# Patient Record
Sex: FEMALE | Race: WHITE | Employment: PART TIME | URBAN - METROPOLITAN AREA
[De-identification: names, ages, dates, MRNs, and addresses within clinical notes are randomized per-mention and may not be internally consistent; named-entity substitution may affect disease eponyms.]

---

## 2017-01-19 ENCOUNTER — TRANSFERRED RECORDS (OUTPATIENT)
Dept: HEALTH INFORMATION MANAGEMENT | Facility: CLINIC | Age: 67
End: 2017-01-19

## 2017-01-24 ENCOUNTER — OFFICE VISIT (OUTPATIENT)
Dept: FAMILY MEDICINE | Facility: CLINIC | Age: 67
End: 2017-01-24
Payer: COMMERCIAL

## 2017-01-24 VITALS
SYSTOLIC BLOOD PRESSURE: 147 MMHG | DIASTOLIC BLOOD PRESSURE: 81 MMHG | TEMPERATURE: 97.5 F | HEART RATE: 80 BPM | HEIGHT: 62 IN | BODY MASS INDEX: 35.88 KG/M2 | WEIGHT: 195 LBS | OXYGEN SATURATION: 95 %

## 2017-01-24 DIAGNOSIS — I10 HYPERTENSION GOAL BP (BLOOD PRESSURE) < 140/90: ICD-10-CM

## 2017-01-24 DIAGNOSIS — Z01.818 PREOP GENERAL PHYSICAL EXAM: Primary | ICD-10-CM

## 2017-01-24 DIAGNOSIS — Z13.6 CARDIOVASCULAR SCREENING; LDL GOAL LESS THAN 160: ICD-10-CM

## 2017-01-24 PROCEDURE — 93000 ELECTROCARDIOGRAM COMPLETE: CPT | Performed by: FAMILY MEDICINE

## 2017-01-24 PROCEDURE — 99214 OFFICE O/P EST MOD 30 MIN: CPT | Performed by: FAMILY MEDICINE

## 2017-01-24 RX ORDER — LISINOPRIL 20 MG/1
20 TABLET ORAL DAILY
Qty: 90 TABLET | Refills: 3 | Status: SHIPPED | OUTPATIENT
Start: 2017-01-24 | End: 2017-08-04 | Stop reason: DRUGHIGH

## 2017-01-24 ASSESSMENT — PAIN SCALES - GENERAL: PAINLEVEL: SEVERE PAIN (7)

## 2017-01-24 NOTE — MR AVS SNAPSHOT
After Visit Summary   1/24/2017    Lily Linares    MRN: [id number]           Patient Information     Date Of Birth          1950        Visit Information        Provider Department      1/24/2017 10:40 AM Joao Pulliam MD [facility]        Today's Diagnoses     Preop general physical exam    -  1     CARDIOVASCULAR SCREENING; LDL GOAL LESS THAN 160         Hypertension goal BP (blood pressure) < 140/90           Care Instructions      Before Your Surgery      Call your surgeon if there is any change in your health. This includes signs of a cold or flu (such as a sore throat, runny nose, cough, rash or fever).    Do not smoke, drink alcohol or take over the counter medicine (unless your surgeon or primary care doctor tells you to) for the 24 hours before and after surgery.    If you take prescribed drugs: Follow your doctor s orders about which medicines to take and which to stop until after surgery.    Eating and drinking prior to surgery: follow the instructions from your surgeon    Take a shower or bath the night before surgery. Use the soap your surgeon gave you to gently clean your skin. If you do not have soap from your surgeon, use your regular soap. Do not shave or scrub the surgery site.  Wear clean pajamas and have clean sheets on your bed.         Follow-ups after your visit        Your next 10 appointments already scheduled     Feb 01, 2017   Procedure with Dedrick Ibanez MD   [facility] (--)    [address] [address] [phone]           The medical center is located at [address] (between [address] in [place], [place] miles north of [place]).              Who to contact     If you have questions or need follow up information about today's clinic visit or your schedule please contact [facility] directly at [phone].  Normal or non-critical lab and imaging results will  "be communicated to you by Hotreaderhart, letter or phone within 4 business days after the clinic has received the results. If you do not hear from us within 7 days, please contact the clinic through [email or web address] or phone. If you have a critical or abnormal lab result, we will notify you by phone as soon as possible.  Submit refill requests through [email or web address] or call your pharmacy and they will forward the refill request to us. Please allow 3 business days for your refill to be completed.          Additional Information About Your Visit        [email or web address] Information     [email or web address] gives you secure access to your electronic health record. If you see a primary care provider, you can also send messages to your care team and make appointments. If you have questions, please call your primary care clinic.  If you do not have a primary care provider, please call [phone] and they will assist you.        Care EveryWhere ID     This is your Care EveryWhere ID. This could be used by other organizations to access your [facility] medical records  [id number]        Your Vitals Were     Pulse Temperature Height BMI (Body Mass Index) Pulse Oximetry Breastfeeding?    80 97.5  F (36.4  C) (Tympanic) 5' 1.5\" (1.562 m) 36.25 kg/m2 95% No       Blood Pressure from Last 3 Encounters:   01/24/17 147/81   12/20/16 174/83   08/19/16 172/85    Weight from Last 3 Encounters:   01/24/17 195 lb (88.451 kg)   12/20/16 197 lb (89.359 kg)   08/19/16 201 lb (91.173 kg)              We Performed the Following     EKG 12-lead complete w/read - Clinics          Today's Medication Changes          These changes are accurate as of: 1/24/17 12:07 PM.  If you have any questions, ask your nurse or doctor.               Start taking these medicines.        Dose/Directions    lisinopril 20 MG tablet   Commonly known as:  PRINIVIL/ZESTRIL   Used for:  Hypertension goal BP (blood pressure) < 140/90   Started by:  Joao Pulliam MD        Dose:  20 mg   Take 1 tablet " (20 mg) by mouth daily   Quantity:  90 tablet   Refills:  3            Where to get your medicines      These medications were sent to [facility] - [place], [place] - [address] [address]  [address] [address] [address]     Phone:  [phone]    - lisinopril 20 MG tablet             Primary Care Provider Office Phone # Fax #    Campbell Jacobs [phone]       [facility] [address] [facility] [address]        Thank you!     Thank you for choosing [facility]  for your care. Our goal is always to provide you with excellent care. Hearing back from our patients is one way we can continue to improve our services. Please take a few minutes to complete the written survey that you may receive in the mail after your visit with us. Thank you!             Your Updated Medication List - Protect others around you: Learn how to safely use, store and throw away your medicines at www.disposemymeds.org.          This list is accurate as of: 1/24/17 12:07 PM.  Always use your most recent med list.                   Brand Name Dispense Instructions for use    albuterol 108 (90 BASE) MCG/ACT Inhaler    albuterol    1 Inhaler    Inhale 2 puffs into the lungs every 4 hours as needed for shortness of breath / dyspnea       lisinopril 20 MG tablet    PRINIVIL/ZESTRIL    90 tablet    Take 1 tablet (20 mg) by mouth daily       omeprazole 20 MG CR capsule    priLOSEC    90 capsule    Take 1 capsule (20 mg) by mouth daily       pimecrolimus 1 % cream    ELIDEL    60 g    Apply topically 2 times daily

## 2017-01-24 NOTE — PROGRESS NOTES
[facility]  [address]  [facility] [address]  [phone]  Dept: [phone]    PRE-OP EVALUATION:  Today's date: 2017    Hypertension: The past 2 years her blood pressures in clinic have been averaging 145/95. Today she was started on lisinopril 20 mg daily.    Lily Linares (: 1950) presents for pre-operative evaluation assessment as requested by Dr. armijo.  She requires evaluation and anesthesia risk assessment prior to undergoing surgery/procedure for treatment of Right Kness .  Proposed procedure: Right Total Knee Arthroplasty    Date of Surgery/ Procedure: 17  Time of Surgery/ Procedure: unknown  Hospital/Surgical Facility: [facility]    Primary Physician: Campbell Jacobs  Type of Anesthesia Anticipated: unknown    Patient has a Health Care Directive or Living Will:  YES     1. NO - Do you have a history of heart attack, stroke, stent, bypass or surgery on an artery in the head, neck, heart or legs?  2. NO - Do you ever have any pain or discomfort in your chest?  3. NO - Do you have a history of  Heart Failure?  4. NO - Are you troubled by shortness of breath when: walking on the level, up a slight hill or at night?  5. NO - Do you currently have a cold, bronchitis or other respiratory infection?  6. NO - Do you have a cough, shortness of breath or wheezing?  7. NO - Do you sometimes get pains in the calves of your legs when you walk?  8. NO - Do you or anyone in your family have previous history of blood clots?  9. NO - Do you or does anyone in your family have a serious bleeding problem such as prolonged bleeding following surgeries or cuts?  10. NO - Have you ever had problems with anemia or been told to take iron pills?  11. NO - Have you had any abnormal blood loss such as black, tarry or bloody stools, or abnormal vaginal bleeding?  12. NO - Have you ever had a blood transfusion?  13. NO - Have you or any of your relatives ever had  problems with anesthesia?  14. NO - Do you have sleep apnea, excessive snoring or daytime drowsiness?  15. NO - Do you have any prosthetic heart valves?  16. NO - Do you have prosthetic joints?  17. NO - Is there any chance that you may be pregnant?      HPI:                                                      Brief HPI related to upcoming procedure: Fairly recent severe right knee pain.      See problem list for active medical problems.  Problems all longstanding and stable, except as noted/documented.  See ROS for pertinent symptoms related to these conditions.                                                                                                  .    MEDICAL HISTORY:                                                      Patient Active Problem List    Diagnosis Date Noted     Cellulitis and abscess of trunk 12/30/2014     Scar condition and fibrosis of skin 04/15/2014     Advanced directives, counseling/discussion 04/02/2014     Patient has completed an Advance/Health Care Directive (HCD), to bring in copy to be scanned into Epic.    Anali Yeung  April 2, 2014         CARDIOVASCULAR SCREENING; LDL GOAL LESS THAN 160 10/31/2010     Eczema of eyelid 03/16/2010     Atopic rhinitis 03/16/2010     (Problem list name updated by automated process. Provider to review and confirm.)       Conjunctivitis, allergic 03/16/2010     Obesity 10/16/2007     Problem list name updated by automated process. 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     OTHER ATOPIC DERMATITIS 10/25/2005     Major depression in complete remission (H)      Past Surgical History   Procedure Laterality Date     Surgical history of -   1969     lt ovary     C appendectomy  1969     Surgical history of -   2007     ORIF left elbow     Esophagoscopy, gastroscopy, duodenoscopy (egd), combined  1/16/2014     Procedure: COMBINED ESOPHAGOSCOPY, GASTROSCOPY, DUODENOSCOPY (EGD),  BIOPSY SINGLE OR MULTIPLE;  Gastroscopy;  Surgeon: Zachariah Nash MD;  Location: [facility]     Mammoplasty reduction bilateral  4/8/2014     Procedure: MAMMOPLASTY REDUCTION BILATERAL;  Bilateral Breast Reduction with Free Nipple/Areola Graft;  Surgeon: Flakita Garcia MD;  Location: [facility]     Current Outpatient Prescriptions   Medication Sig Dispense Refill     omeprazole (PRILOSEC) 20 MG capsule Take 1 capsule (20 mg) by mouth daily 90 capsule 3     pimecrolimus (ELIDEL) 1 % cream Apply topically 2 times daily 60 g 0     albuterol (ALBUTEROL) 108 (90 BASE) MCG/ACT inhaler Inhale 2 puffs into the lungs every 4 hours as needed for shortness of breath / dyspnea 1 Inhaler 0     OTC products: None, except as noted above    Allergies   Allergen Reactions     Nka [No Known Allergies]       Latex Allergy: NO    Social History   Substance Use Topics     Smoking status: Never Smoker      Smokeless tobacco: Never Used     Alcohol Use: Yes      Comment: 1 a day if that     History   Drug Use No       REVIEW OF SYSTEMS:                                                    C: NEGATIVE for fever, chills, change in weight  E/M: NEGATIVE for ear, mouth and throat problems  R: NEGATIVE for significant cough or SOB  CV: NEGATIVE for chest pain, palpitations or peripheral edema    EXAM:                                                    There were no vitals taken for this visit.  GENERAL APPEARANCE: healthy, alert and no distress  HENT: ear canals and TM's normal and nose and mouth without ulcers or lesions  RESP: lungs clear to auscultation - no rales, rhonchi or wheezes  CV: regular rate and rhythm, normal S1 S2, no S3 or S4 and no murmur, click or rub   ABDOMEN: soft, nontender, no HSM or masses and bowel sounds normal  NEURO: Normal strength and tone, sensory exam grossly normal, mentation intact and speech normal    DIAGNOSTICS:                                                    EKG: appears normal, NSR, normal axis, normal  intervals, no acute ST/T changes c/w ischemia, no LVH by voltage criteria, unchanged from previous tracings    Recent Labs   Lab Test  04/12/16   0759  01/13/14   0839   HGB   --   14.2   PLT   --   258   NA  140  140   POTASSIUM  4.0  4.5   CR  0.75  0.80        IMPRESSION:                                                    Reason for surgery/procedure: Chronic right knee pain.    The proposed surgical procedure is considered INTERMEDIATE risk.    REVISED CARDIAC RISK INDEX  The patient has the following serious cardiovascular risks for perioperative complications such as (MI, PE, VFib and 3  AV Block):  No serious cardiac risks  INTERPRETATION: 0 risks: Class I (very low risk - 0.4% complication rate)    The patient has the following additional risks for perioperative complications:  No identified additional risks    No diagnosis found.    RECOMMENDATIONS:                                                          --Patient is to take all scheduled medications on the day of surgery EXCEPT for modifications listed below.    APPROVAL GIVEN to proceed with proposed procedure, without further diagnostic evaluation       Signed Electronically by: Joao Pulliam MD    Copy of this evaluation report is provided to requesting physician.    [facility] Preop Guidelines

## 2017-01-24 NOTE — NURSING NOTE
"Chief Complaint   Patient presents with     Pre-Op Exam     Right Total Knee Arthroplasty 2/1/17       Initial /81 mmHg  Pulse 80  Temp(Src) 97.5  F (36.4  C) (Tympanic)  Ht 5' 1.5\" (1.562 m)  Wt 195 lb (88.451 kg)  BMI 36.25 kg/m2  SpO2 95%  Breastfeeding? No Estimated body mass index is 36.25 kg/(m^2) as calculated from the following:    Height as of this encounter: 5' 1.5\" (1.562 m).    Weight as of this encounter: 195 lb (88.451 kg).  BP completed using cuff size: lucien Butler CMA AAMA      "

## 2017-01-30 ENCOUNTER — ANESTHESIA EVENT (OUTPATIENT)
Dept: SURGERY | Facility: CLINIC | Age: 67
DRG: 470 | End: 2017-01-30
Payer: COMMERCIAL

## 2017-01-30 PROBLEM — I10 HYPERTENSION GOAL BP (BLOOD PRESSURE) < 140/90: Status: ACTIVE | Noted: 2017-01-30

## 2017-02-01 ENCOUNTER — HOSPITAL ENCOUNTER (INPATIENT)
Facility: CLINIC | Age: 67
LOS: 3 days | Discharge: HOME | DRG: 470 | End: 2017-02-04
Attending: ORTHOPAEDIC SURGERY | Admitting: ORTHOPAEDIC SURGERY
Payer: COMMERCIAL

## 2017-02-01 ENCOUNTER — ANESTHESIA (OUTPATIENT)
Dept: SURGERY | Facility: CLINIC | Age: 67
DRG: 470 | End: 2017-02-01
Payer: COMMERCIAL

## 2017-02-01 ENCOUNTER — APPOINTMENT (OUTPATIENT)
Dept: GENERAL RADIOLOGY | Facility: CLINIC | Age: 67
DRG: 470 | End: 2017-02-01
Attending: ORTHOPAEDIC SURGERY
Payer: COMMERCIAL

## 2017-02-01 DIAGNOSIS — Z96.651 STATUS POST TOTAL RIGHT KNEE REPLACEMENT: Primary | ICD-10-CM

## 2017-02-01 PROBLEM — I10 HYPERTENSION GOAL BP (BLOOD PRESSURE) < 140/90: Chronic | Status: ACTIVE | Noted: 2017-01-30

## 2017-02-01 PROBLEM — K21.9 GERD (GASTROESOPHAGEAL REFLUX DISEASE): Chronic | Status: ACTIVE | Noted: 2017-02-01

## 2017-02-01 PROBLEM — Z98.890 POST-OPERATIVE STATE: Status: ACTIVE | Noted: 2017-02-01

## 2017-02-01 LAB
BASOPHILS # BLD AUTO: 0 10E9/L (ref 0–0.2)
BASOPHILS NFR BLD AUTO: 0.5 %
CREAT SERPL-MCNC: 0.75 MG/DL (ref 0.52–1.04)
DIFFERENTIAL METHOD BLD: ABNORMAL
EOSINOPHIL # BLD AUTO: 0.1 10E9/L (ref 0–0.7)
EOSINOPHIL NFR BLD AUTO: 1.2 %
ERYTHROCYTE [DISTWIDTH] IN BLOOD BY AUTOMATED COUNT: 14.8 % (ref 10–15)
GFR SERPL CREATININE-BSD FRML MDRD: 77 ML/MIN/1.7M2
HCT VFR BLD AUTO: 43.6 % (ref 35–47)
HGB BLD-MCNC: 14.7 G/DL (ref 11.7–15.7)
IMM GRANULOCYTES # BLD: 0 10E9/L (ref 0–0.4)
IMM GRANULOCYTES NFR BLD: 0.2 %
INR PPP: 0.98 (ref 0.86–1.14)
LYMPHOCYTES # BLD AUTO: 1.6 10E9/L (ref 0.8–5.3)
LYMPHOCYTES NFR BLD AUTO: 18.9 %
MCH RBC QN AUTO: 26 PG (ref 26.5–33)
MCHC RBC AUTO-ENTMCNC: 33.7 G/DL (ref 31.5–36.5)
MCV RBC AUTO: 77 FL (ref 78–100)
MONOCYTES # BLD AUTO: 0.5 10E9/L (ref 0–1.3)
MONOCYTES NFR BLD AUTO: 5.6 %
NEUTROPHILS # BLD AUTO: 6.2 10E9/L (ref 1.6–8.3)
NEUTROPHILS NFR BLD AUTO: 73.6 %
PLATELET # BLD AUTO: 313 10E9/L (ref 150–450)
POTASSIUM SERPL-SCNC: 3.7 MMOL/L (ref 3.4–5.3)
RBC # BLD AUTO: 5.66 10E12/L (ref 3.8–5.2)
WBC # BLD AUTO: 8.5 10E9/L (ref 4–11)

## 2017-02-01 PROCEDURE — 0SRD0J9 REPLACEMENT OF LEFT KNEE JOINT WITH SYNTHETIC SUBSTITUTE, CEMENTED, OPEN APPROACH: ICD-10-PCS | Performed by: ORTHOPAEDIC SURGERY

## 2017-02-01 PROCEDURE — 25800025 ZZH RX 258: Performed by: NURSE ANESTHETIST, CERTIFIED REGISTERED

## 2017-02-01 PROCEDURE — 25000128 H RX IP 250 OP 636: Performed by: NURSE ANESTHETIST, CERTIFIED REGISTERED

## 2017-02-01 PROCEDURE — 36000063 ZZH SURGERY LEVEL 4 EA 15 ADDTL MIN: Performed by: ORTHOPAEDIC SURGERY

## 2017-02-01 PROCEDURE — 73560 X-RAY EXAM OF KNEE 1 OR 2: CPT | Mod: RT

## 2017-02-01 PROCEDURE — 40000306 ZZH STATISTIC PRE PROC ASSESS II: Performed by: ORTHOPAEDIC SURGERY

## 2017-02-01 PROCEDURE — 25000125 ZZHC RX 250: Performed by: NURSE ANESTHETIST, CERTIFIED REGISTERED

## 2017-02-01 PROCEDURE — 37000009 ZZH ANESTHESIA TECHNICAL FEE, EACH ADDTL 15 MIN: Performed by: ORTHOPAEDIC SURGERY

## 2017-02-01 PROCEDURE — 25000132 ZZH RX MED GY IP 250 OP 250 PS 637: Performed by: ORTHOPAEDIC SURGERY

## 2017-02-01 PROCEDURE — 84132 ASSAY OF SERUM POTASSIUM: CPT | Performed by: PHYSICIAN ASSISTANT

## 2017-02-01 PROCEDURE — 36415 COLL VENOUS BLD VENIPUNCTURE: CPT | Performed by: PHYSICIAN ASSISTANT

## 2017-02-01 PROCEDURE — 85610 PROTHROMBIN TIME: CPT | Performed by: PHYSICIAN ASSISTANT

## 2017-02-01 PROCEDURE — 12000007 ZZH R&B INTERMEDIATE

## 2017-02-01 PROCEDURE — C1776 JOINT DEVICE (IMPLANTABLE): HCPCS | Performed by: ORTHOPAEDIC SURGERY

## 2017-02-01 PROCEDURE — 27110028 ZZH OR GENERAL SUPPLY NON-STERILE: Performed by: ORTHOPAEDIC SURGERY

## 2017-02-01 PROCEDURE — 85025 COMPLETE CBC W/AUTO DIFF WBC: CPT | Performed by: PHYSICIAN ASSISTANT

## 2017-02-01 PROCEDURE — 27810169 ZZH OR IMPLANT GENERAL: Performed by: ORTHOPAEDIC SURGERY

## 2017-02-01 PROCEDURE — 36000093 ZZH SURGERY LEVEL 4 1ST 30 MIN: Performed by: ORTHOPAEDIC SURGERY

## 2017-02-01 PROCEDURE — 25000128 H RX IP 250 OP 636: Performed by: ORTHOPAEDIC SURGERY

## 2017-02-01 PROCEDURE — 71000012 ZZH RECOVERY PHASE 1 LEVEL 1 FIRST HR: Performed by: ORTHOPAEDIC SURGERY

## 2017-02-01 PROCEDURE — 82565 ASSAY OF CREATININE: CPT | Performed by: PHYSICIAN ASSISTANT

## 2017-02-01 PROCEDURE — 25000125 ZZHC RX 250: Performed by: PHYSICIAN ASSISTANT

## 2017-02-01 PROCEDURE — 27210794 ZZH OR GENERAL SUPPLY STERILE: Performed by: ORTHOPAEDIC SURGERY

## 2017-02-01 PROCEDURE — 37000008 ZZH ANESTHESIA TECHNICAL FEE, 1ST 30 MIN: Performed by: ORTHOPAEDIC SURGERY

## 2017-02-01 PROCEDURE — 25000128 H RX IP 250 OP 636: Performed by: PHYSICIAN ASSISTANT

## 2017-02-01 DEVICE — BONE CEMENT PALACOS 00-1112-140-01: Type: IMPLANTABLE DEVICE | Site: KNEE | Status: FUNCTIONAL

## 2017-02-01 DEVICE — IMP BASEPLATE TIBIAL HOWM TRI 3 5520-B-300: Type: IMPLANTABLE DEVICE | Site: KNEE | Status: FUNCTIONAL

## 2017-02-01 DEVICE — IMP COMP PATELLA HOWM TRI ASYM 35X10MM 5551-G-350: Type: IMPLANTABLE DEVICE | Site: KNEE | Status: FUNCTIONAL

## 2017-02-01 DEVICE — IMP INSERT TIBIAL HOWM TRI SIZE 3 09MM 5532-G-309: Type: IMPLANTABLE DEVICE | Site: KNEE | Status: FUNCTIONAL

## 2017-02-01 DEVICE — IMP COMP FEM STRK TRIATHLN PS RT 4 5515-F-402: Type: IMPLANTABLE DEVICE | Site: KNEE | Status: FUNCTIONAL

## 2017-02-01 RX ORDER — CEFAZOLIN SODIUM 1 G/3ML
1 INJECTION, POWDER, FOR SOLUTION INTRAMUSCULAR; INTRAVENOUS SEE ADMIN INSTRUCTIONS
Status: DISCONTINUED | OUTPATIENT
Start: 2017-02-01 | End: 2017-02-01 | Stop reason: HOSPADM

## 2017-02-01 RX ORDER — SODIUM CHLORIDE 9 MG/ML
INJECTION, SOLUTION INTRAVENOUS CONTINUOUS
Status: DISCONTINUED | OUTPATIENT
Start: 2017-02-01 | End: 2017-02-02 | Stop reason: CLARIF

## 2017-02-01 RX ORDER — SODIUM CHLORIDE, SODIUM LACTATE, POTASSIUM CHLORIDE, CALCIUM CHLORIDE 600; 310; 30; 20 MG/100ML; MG/100ML; MG/100ML; MG/100ML
INJECTION, SOLUTION INTRAVENOUS CONTINUOUS
Status: DISCONTINUED | OUTPATIENT
Start: 2017-02-01 | End: 2017-02-01 | Stop reason: HOSPADM

## 2017-02-01 RX ORDER — BUPIVACAINE HYDROCHLORIDE 7.5 MG/ML
INJECTION, SOLUTION INTRASPINAL PRN
Status: DISCONTINUED | OUTPATIENT
Start: 2017-02-01 | End: 2017-02-01

## 2017-02-01 RX ORDER — LISINOPRIL 20 MG/1
20 TABLET ORAL DAILY
Status: DISCONTINUED | OUTPATIENT
Start: 2017-02-01 | End: 2017-02-01

## 2017-02-01 RX ORDER — ONDANSETRON 2 MG/ML
4 INJECTION INTRAMUSCULAR; INTRAVENOUS EVERY 30 MIN PRN
Status: DISCONTINUED | OUTPATIENT
Start: 2017-02-01 | End: 2017-02-01 | Stop reason: HOSPADM

## 2017-02-01 RX ORDER — ONDANSETRON 4 MG/1
4 TABLET, ORALLY DISINTEGRATING ORAL EVERY 30 MIN PRN
Status: DISCONTINUED | OUTPATIENT
Start: 2017-02-01 | End: 2017-02-01 | Stop reason: HOSPADM

## 2017-02-01 RX ORDER — OXYCODONE HYDROCHLORIDE 5 MG/1
5-10 TABLET ORAL EVERY 4 HOURS PRN
Status: DISCONTINUED | OUTPATIENT
Start: 2017-02-01 | End: 2017-02-04 | Stop reason: HOSPADM

## 2017-02-01 RX ORDER — KETOROLAC TROMETHAMINE 30 MG/ML
INJECTION, SOLUTION INTRAMUSCULAR; INTRAVENOUS PRN
Status: DISCONTINUED | OUTPATIENT
Start: 2017-02-01 | End: 2017-02-01

## 2017-02-01 RX ORDER — LIDOCAINE 40 MG/G
CREAM TOPICAL
Status: DISCONTINUED | OUTPATIENT
Start: 2017-02-01 | End: 2017-02-01 | Stop reason: HOSPADM

## 2017-02-01 RX ORDER — ALBUTEROL SULFATE 0.83 MG/ML
2.5 SOLUTION RESPIRATORY (INHALATION)
Status: DISCONTINUED | OUTPATIENT
Start: 2017-02-01 | End: 2017-02-01 | Stop reason: HOSPADM

## 2017-02-01 RX ORDER — GABAPENTIN 100 MG/1
100 CAPSULE ORAL 3 TIMES DAILY
Status: DISCONTINUED | OUTPATIENT
Start: 2017-02-01 | End: 2017-02-04 | Stop reason: HOSPADM

## 2017-02-01 RX ORDER — ALBUTEROL SULFATE 0.83 MG/ML
2.5 SOLUTION RESPIRATORY (INHALATION) EVERY 4 HOURS PRN
Status: DISCONTINUED | OUTPATIENT
Start: 2017-02-01 | End: 2017-02-01 | Stop reason: HOSPADM

## 2017-02-01 RX ORDER — HYDROXYZINE HYDROCHLORIDE 10 MG/1
10 TABLET, FILM COATED ORAL EVERY 6 HOURS PRN
Status: DISCONTINUED | OUTPATIENT
Start: 2017-02-01 | End: 2017-02-04 | Stop reason: HOSPADM

## 2017-02-01 RX ORDER — MEPERIDINE HYDROCHLORIDE 25 MG/ML
12.5 INJECTION INTRAMUSCULAR; INTRAVENOUS; SUBCUTANEOUS
Status: DISCONTINUED | OUTPATIENT
Start: 2017-02-01 | End: 2017-02-01 | Stop reason: HOSPADM

## 2017-02-01 RX ORDER — LISINOPRIL 20 MG/1
20 TABLET ORAL DAILY
Status: DISCONTINUED | OUTPATIENT
Start: 2017-02-01 | End: 2017-02-04 | Stop reason: HOSPADM

## 2017-02-01 RX ORDER — ALBUTEROL SULFATE 90 UG/1
2 AEROSOL, METERED RESPIRATORY (INHALATION) EVERY 4 HOURS PRN
Status: DISCONTINUED | OUTPATIENT
Start: 2017-02-01 | End: 2017-02-04 | Stop reason: HOSPADM

## 2017-02-01 RX ORDER — SODIUM CHLORIDE, SODIUM LACTATE, POTASSIUM CHLORIDE, CALCIUM CHLORIDE 600; 310; 30; 20 MG/100ML; MG/100ML; MG/100ML; MG/100ML
1000 INJECTION, SOLUTION INTRAVENOUS CONTINUOUS
Status: DISCONTINUED | OUTPATIENT
Start: 2017-02-01 | End: 2017-02-01 | Stop reason: HOSPADM

## 2017-02-01 RX ORDER — HYDROMORPHONE HYDROCHLORIDE 1 MG/ML
.3-.5 INJECTION, SOLUTION INTRAMUSCULAR; INTRAVENOUS; SUBCUTANEOUS
Status: DISCONTINUED | OUTPATIENT
Start: 2017-02-01 | End: 2017-02-04 | Stop reason: HOSPADM

## 2017-02-01 RX ORDER — ACETAMINOPHEN 325 MG/1
975 TABLET ORAL EVERY 8 HOURS
Status: COMPLETED | OUTPATIENT
Start: 2017-02-01 | End: 2017-02-04

## 2017-02-01 RX ORDER — CEFAZOLIN SODIUM 2 G/100ML
2 INJECTION, SOLUTION INTRAVENOUS
Status: COMPLETED | OUTPATIENT
Start: 2017-02-01 | End: 2017-02-01

## 2017-02-01 RX ORDER — LIDOCAINE HYDROCHLORIDE 10 MG/ML
INJECTION, SOLUTION INFILTRATION; PERINEURAL PRN
Status: DISCONTINUED | OUTPATIENT
Start: 2017-02-01 | End: 2017-02-01

## 2017-02-01 RX ORDER — CEFAZOLIN SODIUM 2 G/100ML
2 INJECTION, SOLUTION INTRAVENOUS EVERY 8 HOURS
Status: COMPLETED | OUTPATIENT
Start: 2017-02-01 | End: 2017-02-02

## 2017-02-01 RX ORDER — KETAMINE HYDROCHLORIDE 50 MG/ML
INJECTION, SOLUTION INTRAMUSCULAR; INTRAVENOUS PRN
Status: DISCONTINUED | OUTPATIENT
Start: 2017-02-01 | End: 2017-02-01

## 2017-02-01 RX ORDER — FENTANYL CITRATE 50 UG/ML
25-50 INJECTION, SOLUTION INTRAMUSCULAR; INTRAVENOUS
Status: DISCONTINUED | OUTPATIENT
Start: 2017-02-01 | End: 2017-02-01 | Stop reason: HOSPADM

## 2017-02-01 RX ORDER — NALOXONE HYDROCHLORIDE 0.4 MG/ML
.1-.4 INJECTION, SOLUTION INTRAMUSCULAR; INTRAVENOUS; SUBCUTANEOUS
Status: DISCONTINUED | OUTPATIENT
Start: 2017-02-01 | End: 2017-02-04 | Stop reason: HOSPADM

## 2017-02-01 RX ORDER — ACETAMINOPHEN 325 MG/1
650 TABLET ORAL EVERY 4 HOURS PRN
Status: DISCONTINUED | OUTPATIENT
Start: 2017-02-04 | End: 2017-02-04 | Stop reason: HOSPADM

## 2017-02-01 RX ORDER — ONDANSETRON 2 MG/ML
INJECTION INTRAMUSCULAR; INTRAVENOUS PRN
Status: DISCONTINUED | OUTPATIENT
Start: 2017-02-01 | End: 2017-02-01

## 2017-02-01 RX ORDER — ONDANSETRON 4 MG/1
4 TABLET, ORALLY DISINTEGRATING ORAL EVERY 6 HOURS PRN
Status: DISCONTINUED | OUTPATIENT
Start: 2017-02-01 | End: 2017-02-04 | Stop reason: HOSPADM

## 2017-02-01 RX ORDER — PROPOFOL 10 MG/ML
INJECTION, EMULSION INTRAVENOUS PRN
Status: DISCONTINUED | OUTPATIENT
Start: 2017-02-01 | End: 2017-02-01

## 2017-02-01 RX ORDER — HYDROMORPHONE HYDROCHLORIDE 1 MG/ML
.3-.5 INJECTION, SOLUTION INTRAMUSCULAR; INTRAVENOUS; SUBCUTANEOUS EVERY 10 MIN PRN
Status: DISCONTINUED | OUTPATIENT
Start: 2017-02-01 | End: 2017-02-01 | Stop reason: HOSPADM

## 2017-02-01 RX ORDER — ONDANSETRON 2 MG/ML
4 INJECTION INTRAMUSCULAR; INTRAVENOUS EVERY 6 HOURS PRN
Status: DISCONTINUED | OUTPATIENT
Start: 2017-02-01 | End: 2017-02-04 | Stop reason: HOSPADM

## 2017-02-01 RX ORDER — KETOROLAC TROMETHAMINE 15 MG/ML
15 INJECTION, SOLUTION INTRAMUSCULAR; INTRAVENOUS EVERY 6 HOURS PRN
Status: DISCONTINUED | OUTPATIENT
Start: 2017-02-01 | End: 2017-02-02

## 2017-02-01 RX ORDER — DEXAMETHASONE SODIUM PHOSPHATE 4 MG/ML
INJECTION, SOLUTION INTRA-ARTICULAR; INTRALESIONAL; INTRAMUSCULAR; INTRAVENOUS; SOFT TISSUE PRN
Status: DISCONTINUED | OUTPATIENT
Start: 2017-02-01 | End: 2017-02-01

## 2017-02-01 RX ORDER — NALOXONE HYDROCHLORIDE 0.4 MG/ML
.1-.4 INJECTION, SOLUTION INTRAMUSCULAR; INTRAVENOUS; SUBCUTANEOUS
Status: DISCONTINUED | OUTPATIENT
Start: 2017-02-01 | End: 2017-02-01 | Stop reason: HOSPADM

## 2017-02-01 RX ORDER — ALUMINA, MAGNESIA, AND SIMETHICONE 2400; 2400; 240 MG/30ML; MG/30ML; MG/30ML
15-30 SUSPENSION ORAL EVERY 4 HOURS PRN
Status: DISCONTINUED | OUTPATIENT
Start: 2017-02-01 | End: 2017-02-04 | Stop reason: HOSPADM

## 2017-02-01 RX ORDER — AMOXICILLIN 250 MG
1-2 CAPSULE ORAL 2 TIMES DAILY
Status: DISCONTINUED | OUTPATIENT
Start: 2017-02-01 | End: 2017-02-04 | Stop reason: HOSPADM

## 2017-02-01 RX ORDER — FENTANYL CITRATE 50 UG/ML
INJECTION, SOLUTION INTRAMUSCULAR; INTRAVENOUS PRN
Status: DISCONTINUED | OUTPATIENT
Start: 2017-02-01 | End: 2017-02-01

## 2017-02-01 RX ORDER — LIDOCAINE 40 MG/G
CREAM TOPICAL
Status: DISCONTINUED | OUTPATIENT
Start: 2017-02-01 | End: 2017-02-04 | Stop reason: HOSPADM

## 2017-02-01 RX ADMIN — ACETAMINOPHEN 975 MG: 325 TABLET, FILM COATED ORAL at 22:04

## 2017-02-01 RX ADMIN — FENTANYL CITRATE 100 MCG: 50 INJECTION, SOLUTION INTRAMUSCULAR; INTRAVENOUS at 15:37

## 2017-02-01 RX ADMIN — ONDANSETRON 4 MG: 2 INJECTION INTRAMUSCULAR; INTRAVENOUS at 15:30

## 2017-02-01 RX ADMIN — KETOROLAC TROMETHAMINE 30 MG: 30 INJECTION, SOLUTION INTRAMUSCULAR; INTRAVENOUS at 17:00

## 2017-02-01 RX ADMIN — LIDOCAINE HYDROCHLORIDE 1 ML: 10 INJECTION, SOLUTION INFILTRATION; PERINEURAL at 15:35

## 2017-02-01 RX ADMIN — FENTANYL CITRATE 100 MCG: 50 INJECTION, SOLUTION INTRAMUSCULAR; INTRAVENOUS at 15:42

## 2017-02-01 RX ADMIN — PROPOFOL 10 MG: 10 INJECTION, EMULSION INTRAVENOUS at 16:32

## 2017-02-01 RX ADMIN — PROPOFOL 40 MG: 10 INJECTION, EMULSION INTRAVENOUS at 16:55

## 2017-02-01 RX ADMIN — LIDOCAINE HYDROCHLORIDE 1 ML: 10 INJECTION, SOLUTION INFILTRATION; PERINEURAL at 14:00

## 2017-02-01 RX ADMIN — PROPOFOL 50 MG: 10 INJECTION, EMULSION INTRAVENOUS at 16:41

## 2017-02-01 RX ADMIN — MIDAZOLAM HYDROCHLORIDE 2 MG: 1 INJECTION, SOLUTION INTRAMUSCULAR; INTRAVENOUS at 15:45

## 2017-02-01 RX ADMIN — MIDAZOLAM HYDROCHLORIDE 3 MG: 1 INJECTION, SOLUTION INTRAMUSCULAR; INTRAVENOUS at 15:28

## 2017-02-01 RX ADMIN — PROPOFOL 40 MG: 10 INJECTION, EMULSION INTRAVENOUS at 16:50

## 2017-02-01 RX ADMIN — GABAPENTIN 100 MG: 100 CAPSULE ORAL at 22:05

## 2017-02-01 RX ADMIN — CEFAZOLIN SODIUM 2 G: 2 INJECTION, SOLUTION INTRAVENOUS at 22:57

## 2017-02-01 RX ADMIN — KETAMINE HYDROCHLORIDE 20 MG: 50 INJECTION, SOLUTION INTRAMUSCULAR; INTRAVENOUS at 16:43

## 2017-02-01 RX ADMIN — BUPIVACAINE HYDROCHLORIDE IN DEXTROSE 2 ML: 7.5 INJECTION, SOLUTION SUBARACHNOID at 15:35

## 2017-02-01 RX ADMIN — SODIUM CHLORIDE: 9 INJECTION, SOLUTION INTRAVENOUS at 22:09

## 2017-02-01 RX ADMIN — SODIUM CHLORIDE, POTASSIUM CHLORIDE, SODIUM LACTATE AND CALCIUM CHLORIDE 1000 ML: 600; 310; 30; 20 INJECTION, SOLUTION INTRAVENOUS at 14:00

## 2017-02-01 RX ADMIN — PROPOFOL 30 MG: 10 INJECTION, EMULSION INTRAVENOUS at 16:46

## 2017-02-01 RX ADMIN — DEXAMETHASONE SODIUM PHOSPHATE 4 MG: 4 INJECTION, SOLUTION INTRAMUSCULAR; INTRAVENOUS at 15:30

## 2017-02-01 RX ADMIN — CEFAZOLIN SODIUM 2 G: 2 INJECTION, SOLUTION INTRAVENOUS at 15:30

## 2017-02-01 RX ADMIN — KETAMINE HYDROCHLORIDE 20 MG: 50 INJECTION, SOLUTION INTRAMUSCULAR; INTRAVENOUS at 15:58

## 2017-02-01 RX ADMIN — KETAMINE HYDROCHLORIDE 20 MG: 50 INJECTION, SOLUTION INTRAMUSCULAR; INTRAVENOUS at 16:07

## 2017-02-01 RX ADMIN — SODIUM CHLORIDE, POTASSIUM CHLORIDE, SODIUM LACTATE AND CALCIUM CHLORIDE: 600; 310; 30; 20 INJECTION, SOLUTION INTRAVENOUS at 16:00

## 2017-02-01 RX ADMIN — PROPOFOL 30 MG: 10 INJECTION, EMULSION INTRAVENOUS at 15:55

## 2017-02-01 RX ADMIN — FENTANYL CITRATE 50 MCG: 50 INJECTION, SOLUTION INTRAMUSCULAR; INTRAVENOUS at 15:45

## 2017-02-01 RX ADMIN — TRANEXAMIC ACID 1 G: 100 INJECTION, SOLUTION INTRAVENOUS at 15:40

## 2017-02-01 RX ADMIN — SODIUM CHLORIDE, POTASSIUM CHLORIDE, SODIUM LACTATE AND CALCIUM CHLORIDE: 600; 310; 30; 20 INJECTION, SOLUTION INTRAVENOUS at 16:45

## 2017-02-01 RX ADMIN — MIDAZOLAM HYDROCHLORIDE 2 MG: 1 INJECTION, SOLUTION INTRAMUSCULAR; INTRAVENOUS at 15:33

## 2017-02-01 RX ADMIN — EPINEPHRINE 0.2 MG: 1 INJECTION, SOLUTION INTRAMUSCULAR; SUBCUTANEOUS at 15:35

## 2017-02-01 NOTE — ANESTHESIA PREPROCEDURE EVALUATION
Anesthesia Evaluation     . Pt has had prior anesthetic. Type: General    No history of anesthetic complications     ROS/MED HX    ENT/Pulmonary:       Neurologic:  - neg neurologic ROS     Cardiovascular:     (+) hypertension----. : . . . :. .       METS/Exercise Tolerance:  >4 METS   Hematologic:  - neg hematologic  ROS       Musculoskeletal:  - neg musculoskeletal ROS       GI/Hepatic:     (+) GERD       Renal/Genitourinary:  - ROS Renal section negative       Endo:     (+) Obesity, .      Psychiatric:  - neg psychiatric ROS       Infectious Disease:  - neg infectious disease ROS       Malignancy:      - no malignancy   Other:    - neg other ROS           Physical Exam  Normal systems: cardiovascular and dental    Airway   Mallampati: I  TM distance: >3 FB  Neck ROM: full    Dental     Cardiovascular   Rhythm and rate: regular and normal      Pulmonary    breath sounds clear to auscultation                    Anesthesia Plan      History & Physical Review  History and physical reviewed and following examination; no interval change.    ASA Status:  3 .    NPO Status:  > 6 hours    Plan for Spinal Maintenance will be Balanced.    PONV prophylaxis:  Ondansetron (or other 5HT-3) and Dexamethasone or Solumedrol       Postoperative Care      Consents  Anesthetic plan, risks, benefits and alternatives discussed with:  Patient..                          .

## 2017-02-01 NOTE — ANESTHESIA CARE TRANSFER NOTE
Patient: Lily Linares    Procedure(s):  Right Total Knee Arthroplasty - Wound Class: I-Clean    Diagnosis: degenerative joint disease  Diagnosis Additional Information: No value filed.    Anesthesia Type:   No value filed.     Note:  Airway :Room Air  Patient transferred to:PACU        Vitals: (Last set prior to Anesthesia Care Transfer)    CRNA VITALS  2/1/2017 1651 - 2/1/2017 1725      2/1/2017             Pulse: 83    SpO2: 95 %                Electronically Signed By: GERALD Lynn CRNA  February 1, 2017  5:25 PM

## 2017-02-01 NOTE — IP AVS SNAPSHOT
MRN:[id number]                      After Visit Summary   2/1/2017    Lily Linares    MRN: [id number]           Thank you!     Thank you for choosing [facility] for your care. Our goal is always to provide you with excellent care. Hearing back from our patients is one way we can continue to improve our services. Please take a few minutes to complete the written survey that you may receive in the mail after you visit with us. Thank you!        Patient Information     Date Of Birth          1950        About your hospital stay     You were admitted on:  February 1, 2017 You last received care in the:  [facility]    You were discharged on:  February 4, 2017        Reason for your hospital stay       Total knee arthroplasty, right            Reason for your hospital stay       Right knee arthoplasty                  Who to Call     For medical emergencies, please call 911.  For non-urgent questions about your medical care, please call your primary care provider or clinic, [phone]  For questions related to your surgery, please call your surgery clinic        Attending Provider     Provider    Dedrick Ibanez MD       Primary Care Provider Office Phone # Fax #    Campbell Jacobs [phone]       [facility] [address] [facility] [address]        After Care Instructions     Activity       Your activity upon discharge: activity as tolerated            Activity       Your activity upon discharge: activity as tolerated            Diet       Follow this diet upon discharge: Orders Placed This Encounter  Regular Diet Adult                  Follow-up Appointments     Follow-up and recommended labs and tests        Follow up with Dr. Ibanez, at (location with clinic name or city) [facility], within 2 weeks  For post-operative appointment.. No follow up labs or test are needed.            Follow-up and recommended labs and tests         Follow up with orthopedics.  No follow up labs or test are needed.                  Additional Services     Physical Therapy Referral       *This therapy referral will be filtered to a centralized scheduling office at [facility] and the patient will receive a call to schedule an appointment at a [place] location most convenient for them. *     [facility] provides Physical Therapy evaluation and treatment and many specialty services across the [place] system.  If requesting a specialty program, please choose from the list below.    If you have not heard from the scheduling office within 2 business days, please call [phone] for all locations, with the exception of [place], please call [phone].  Treatment: Evaluation & Treatment  Special Instructions/Modalities: as indicated  Special Programs: None    Please be aware that coverage of these services is subject to the terms and limitations of your health insurance plan.  Call member services at your health plan with any benefit or coverage questions.      **Note to Provider:  If you are referring outside of [place] for the therapy appointment, please list the name of the location in the  special instructions  above, print the referral and give to the patient to schedule the appointment.                  Further instructions from your care team       1.  Follow up with Parveen Dias PA-C.  in 2 weeks for post op check and x rays as scheduled.  Call [phone] if appointment needed or questions  2.  Use pain medication as directed  3.  Keep incision clean, covered and dry until post op appointment.  You may shower and get incision wet if no drainage is present.  Do not remove light blue mesh (prineo) on incision.   4.  Continue physical therapy as soon as possible.  You will need to call a therapy department of your choice to arrange future appointments.  Your order for physical therapy is included in your discharge  "paperwork.   5.  Take Aspirin 325 mg  daily  for 42 days for anticoagulation-to prevent blood clots          Pending Results     Date and Time Order Name Status Description    2/3/2017 0738 EKG 12-lead, tracing only In process             Statement of Approval     Ordered          02/04/17 1226  I have reviewed and agree with all the recommendations and orders detailed in this document.   EFFECTIVE NOW     Approved and electronically signed by:  Gabriel Fajardo MD             Admission Information        Provider Department Dept Phone    2/1/2017 Dedrick Ibanez MD [facility] [phone]      Your Vitals Were     Blood Pressure Pulse Temperature    156/78 mmHg 77 98.1  F (36.7  C) (Oral)    Respirations Height Weight    16 1.575 m (5' 2\") 92.5 kg (203 lb 14.8 oz)    BMI (Body Mass Index) Pulse Oximetry       37.29 kg/m2 97%       MyChart Information     [employer] gives you secure access to your electronic health record. If you see a primary care provider, you can also send messages to your care team and make appointments. If you have questions, please call your primary care clinic.  If you do not have a primary care provider, please call [phone] and they will assist you.        Care EveryWhere ID     This is your Care EveryWhere ID. This could be used by other organizations to access your [facility] medical records  [id number]           Review of your medicines      START taking        Dose / Directions    acetaminophen 325 MG tablet   Commonly known as:  TYLENOL   Used for:  Status post total right knee replacement   Notes to Patient:  Do not exceed 4,000 mg in 24 hours        Dose:  975 mg   Take 3 tablets (975 mg) by mouth every 8 hours   Quantity:  100 tablet   Refills:  1       aspirin  MG EC tablet   Used for:  Status post total right knee replacement   Notes to Patient:  Start today        Dose:  325 mg   Take 1 tablet (325 mg) by mouth daily   Quantity:  42 tablet   Refills:  " 0         CONTINUE these medicines which have NOT CHANGED        Dose / Directions    albuterol 108 (90 BASE) MCG/ACT Inhaler   Commonly known as:  albuterol        Dose:  2 puff   Inhale 2 puffs into the lungs every 4 hours as needed for shortness of breath / dyspnea   Quantity:  1 Inhaler   Refills:  0       lisinopril 20 MG tablet   Commonly known as:  PRINIVIL/ZESTRIL   Used for:  Hypertension goal BP (blood pressure) < 140/90        Dose:  20 mg   Take 1 tablet (20 mg) by mouth daily   Quantity:  90 tablet   Refills:  3       omeprazole 20 MG CR capsule   Commonly known as:  priLOSEC   Used for:  Gastritis        Dose:  20 mg   Take 1 capsule (20 mg) by mouth daily   Quantity:  90 capsule   Refills:  3       pimecrolimus 1 % cream   Commonly known as:  ELIDEL   Used for:  Dermatitis   Notes to Patient:  Resume if needed        Apply topically 2 times daily   Quantity:  60 g   Refills:  0            Where to get your medicines      These medications were sent to [facility] [address]  [address] [address]     Phone:  [phone]    - acetaminophen 325 MG tablet  - aspirin  MG EC tablet             Protect others around you: Learn how to safely use, store and throw away your medicines at www.disposemymeds.org.             Medication List: This is a list of all your medications and when to take them. Check marks below indicate your daily home schedule. Keep this list as a reference.      Medications           Morning Afternoon Evening Bedtime As Needed    acetaminophen 325 MG tablet   Commonly known as:  TYLENOL   Take 3 tablets (975 mg) by mouth every 8 hours   Last time this was given:  975 mg on 2/4/2017 11:55 AM   Notes to Patient:  Do not exceed 4,000 mg in 24 hours                                albuterol 108 (90 BASE) MCG/ACT Inhaler   Commonly known as:  albuterol   Inhale 2 puffs into the lungs every 4 hours as needed for shortness of breath /  dyspnea                                   aspirin  MG EC tablet   Take 1 tablet (325 mg) by mouth daily   Notes to Patient:  Start today                                   lisinopril 20 MG tablet   Commonly known as:  PRINIVIL/ZESTRIL   Take 1 tablet (20 mg) by mouth daily   Last time this was given:  20 mg on 2/3/2017 10:40 PM   Next Dose Due:  02/04/17                                   omeprazole 20 MG CR capsule   Commonly known as:  priLOSEC   Take 1 capsule (20 mg) by mouth daily   Last time this was given:  20 mg on 2/4/2017  7:47 AM   Next Dose Due:  02/05/17                                   pimecrolimus 1 % cream   Commonly known as:  ELIDEL   Apply topically 2 times daily   Notes to Patient:  Resume if needed                                          More Information        Discharge Instructions for Total Knee Replacement  You have undergone knee replacement surgery. The knee joint forms where the thighbone, shinbone, and kneecap meet. The knee joint is supported by muscles and ligaments, and is lined with a cushioning called cartilage. Over time, cartilage wears away. This can make the knee feel stiff and painful. Your doctor replaced your painful joint with a knee prosthesis (artificial joint) to relieve pain and restore movement. Here are some instructions to follow once at home.  Home care    When you are allowed to shower, carefully wash your incision with soap and water. Rinse the incision well. Then gently pat it dry. Don t rub the incision, or apply creams or lotions. Sit on a shower stool or chair when you shower to keep from falling.    Take pain medication as directed by your doctor.  Sitting and sleeping    Sit in chairs with arms. The arms make it easier for you to stand up or sit down.    Don t sit for more than 30 to 45 minutes at one time.    Nap if you are tired, but don t stay in bed all day.    Sleep with a pillow under your ankle, not your knee. Be sure to change the position of  your leg during the night.  Moving safely    The key to successful recovery is movement with walking and exercising your knee as directed by your doctor. You should be able to put full weight on your leg unless your doctor tells you otherwise.     Walk up and down stairs with support. Try one step at a time--good knee up, bad knee down. Use the railing if possible.    Don t drive until your doctor says it s OK. Most people can start driving about 6 weeks after surgery. Don t drive while you are taking opioid pain medication.  Other precautions    Avoid soaking your knee in water (no hot tubs, bathtubs, swimming pools) until your doctor says it s OK.    Wear the support stockings you were given in the hospital, as instructed by your doctor. You may wear these stockings for four to six weeks after surgery. If needed, you can place a bandage over the incision to prevent irritation from clothing or support stockings.    Arrange your household to keep the items you need handy. Keep everything else out of the way. Remove items that may cause you to fall, such as throw rugs and electrical cords.    Use nonslip bath mats, grab bars, an elevated toilet seat, and a shower chair in your bathroom.    Until your balance, flexibility, and strength improve, use a cane, crutches, a walker, handrails, or someone to help you.    Keep your hands free by using a backpack, gustabo pack, apron, or pockets to carry things.    Prevent infection. Ask your doctor for instructions if you haven t already received them. Any infection will need to be treated immediately with antibiotics. Call your doctor right away if you think you might have an infection.    Tell your dentist that you have an artificial joint and take antibiotics as prescribed before any dental work.    Tell all your health care providers about your artificial joint before any medical procedure.    Maintain a healthy weight. Get help to lose any extra pounds. Added body weight  puts stress on the knee.    Take any medication you may have been given after surgery. This may include blood-thinning medications to prevent blood clots or antibiotics to prevent infection.  Follow-up  You will need to have your staples removed two to three weeks following surgery.     When to seek medical attention  Call 911 right away if you have:    Chest pain.    Shortness of breath.    Any pain or tenderness in your calf.  Otherwise, call your doctor immediately if you have:    Fever of 100.4 degrees Fahrenheit (38 degrees Celsius) or higher, or shaking chills.    Stiffness, or inability to move the knee.    Increased swelling in your leg.    Increased redness, tenderness, or swelling in or around the knee incision.    Drainage from the knee incision.    Increased knee pain.     8558-0173 [employer]. [address] [place] [address], [place] [address]. All rights reserved. This information is not intended as a substitute for professional medical care. Always follow your healthcare professional's instructions.

## 2017-02-01 NOTE — OP NOTE
Total Knee Arthroplasty Operative Note        PLAN:  Weight bearing status: Weight bearing as tolerated   Activity: Activity as tolerated  Patient may move about with assist as indicated or with supervision   Anticoagulation plan:                 Lovenox inpatient and then  mg daily at discharge  for 42 days  Follow up plan                           Follow up in 2 week(s)        Name: Lily Linares    PCP: Campbell Jacobs    Procedure Date: 2/1/2017    Pre-operative diagnosis: degenerative joint disease   Post-operative diagnosis: Same   Procedure: Total knee arthoplasty (Right)   Surgeon: Dedrick Ibanez MD     Assistant(s): Parveen Dias PA-C   Anesthesia: Spinal Anesthesia   Estimated blood loss: Less than 50 ml   Drains: Hemovac   Specimens: None       Findings: See full dictated operative note for details   Complications: None       Comments: See dictated operative report for full details         Procedure and Findings:    After being informed of risks, benefits, alternatives to the procedure, patient desired to proceed, brought to the operating suite where they were placed under spinal anesthetic. Patient received 2 grams of intravenous Ancef.  Parveen Dias PA-C was present for the entire length of the case for the purposes of proper patient positioning, surgical exposure, and patient safety. A time-out verification step was completed.    Attention was directed towards the patella. A midline incision, vastus splitting minimally invasive approach was utilized. The patella was everted. It was measured at 35 mm. It was resected, remeasured and a 35 mm asymmetric patella was positioned. A protector plate was placed on the patella. Attention was directed toward the distal femur. IM guider jennifer was placed. An 10 mm 5 degree distal femoral cut was completed. The IM guide jennifer was then placed in the tibia. External guide jennifer and tower were utilized and 9 mm button stylus was referenced off the lateral tibial plateau  and cuts were completed. The tibia was sized to a 3. Attention was directed towards the distal femur. It was sized to a 4. The 4-in-1 cutting block was placed along the epicondylar axis. It was secured with 2 pins, anterior, posterior and chamfer cuts were completed. Soft tissue balancing was then carried out. Medial release was completed.After assessing range of motion and stability a flexion/extension gap mismatch was identified.  The decision was made to convert to a posterior stabilized device.  Femoral position was verified.  The PS cutting guide was fixated with 3 pins. PCL resected and trial components were tested.  Ultimately a 9 mm insert gave excellent range of motion and stability throughout the range of motion. Patella was noted to track symmetrically throughout the range to motion. The tibial tray rotation was marked, size was verified, it was secured with 2 pins and punched. Trial components were then removed. The cancellous surfaces were pulsatile lavaged. One batch of Palacos cement was mixed under vacuum. The tibia, femur and patella were sequentially cemented in place. The knee was held in extension with axial compression until the cement had hardened. The 9 mm insert was ultimately selected and secured Care was taken to remove any excess cement. Joint capsular injection with anesthetic solution was performed. Excellent range of motion and stability was demonstrated. A Hemovac drain was placed. The joint was copiously irrigated. Joint capsules were closed with interrupted number 1 running Stratafix. Subcutaneous tissues were closed with 2-0 Vicryl and skin was closed with 3-0 running Stratifix and Prineo wound closure. A sterile dressing was applied. Patient tolerated the procedure well without complication and returned to the [facility] in stable condition.      Dedrick Ibanez    Date: 2/1/2017 Time: 5:14 PM  ?    CONFIDENTIALITY NOTICE This message and any included attachments are from [facility] and are intended only for the addressee. The information contained in this message is confidential and may constitute inside or non-public information under international, federal, or state securities laws. Unauthorized forwarding, printing, copying, distribution, or use of such information is strictly prohibited and may be unlawful. If you are not the addressee, please promptly delete this message and notify the sender of the delivery error by e-mail.

## 2017-02-01 NOTE — IP AVS SNAPSHOT
[facility]    [address] [address]    Phone:  [phone]    Fax:  [phone]                                       After Visit Summary   2/1/2017    Lily Linares    MRN: [id number]           After Visit Summary Signature Page     I have received my discharge instructions, and my questions have been answered. I have discussed any challenges I see with this plan with the nurse or doctor.    ..........................................................................................................................................  Patient/Patient Representative Signature      ..........................................................................................................................................  Patient Representative Print Name and Relationship to Patient    ..................................................               ................................................  Date                                            Time    ..........................................................................................................................................  Reviewed by Signature/Title    ...................................................              ..............................................  Date                                                            Time

## 2017-02-01 NOTE — DISCHARGE INSTRUCTIONS
1.  Follow up with Parveen Dias PA-C.  in 2 weeks for post op check and x rays as scheduled.  Call [phone] if appointment needed or questions  2.  Use pain medication as directed  3.  Keep incision clean, covered and dry until post op appointment.  You may shower and get incision wet if no drainage is present.  Do not remove light blue mesh (prineo) on incision.   4.  Continue physical therapy as soon as possible.  You will need to call a therapy department of your choice to arrange future appointments.  Your order for physical therapy is included in your discharge paperwork.   5.  Take Aspirin 325 mg  daily  for 42 days for anticoagulation-to prevent blood clots

## 2017-02-01 NOTE — ANESTHESIA PROCEDURE NOTES
Peripheral nerve/Neuraxial procedure note : intrathecal  Pre-Procedure  Performed by  LARRY JOE   Location:     Pre-Anesthestic Checklist: patient identified, IV checked, site marked, risks and benefits discussed, informed consent, monitors and equipment checked, pre-op evaluation, at physician/surgeon's request and post-op pain management    Timeout  Correct Patient: Yes   Correct Procedure: Yes   Correct Site: Yes   Correct Laterality: Yes   Correct Position: Yes   Site Marked: Yes   .   Procedure Documentation  ASA 3  .    Procedure:    Intrathecal.  Insertion Site:L3-4  (midline approach)      Patient Prep;mask, sterile gloves, povidone-iodine 7.5% surgical scrub, patient draped.  .  Needle: (). # of attempts: 1. # of redirects: 1.  Spinal Needle: Shay tip 25 G. 3.5 in.  Introducer used. Introducer: 20 G. .     Assessment/Narrative  Paresthesias: Yes.  .  .  clear CSF fluid removed .

## 2017-02-02 ENCOUNTER — APPOINTMENT (OUTPATIENT)
Dept: PHYSICAL THERAPY | Facility: CLINIC | Age: 67
DRG: 470 | End: 2017-02-02
Attending: ORTHOPAEDIC SURGERY
Payer: COMMERCIAL

## 2017-02-02 ENCOUNTER — APPOINTMENT (OUTPATIENT)
Dept: OCCUPATIONAL THERAPY | Facility: CLINIC | Age: 67
DRG: 470 | End: 2017-02-02
Attending: ORTHOPAEDIC SURGERY
Payer: COMMERCIAL

## 2017-02-02 LAB
GLUCOSE SERPL-MCNC: 112 MG/DL (ref 70–99)
HGB BLD-MCNC: 11.9 G/DL (ref 11.7–15.7)

## 2017-02-02 PROCEDURE — 25000128 H RX IP 250 OP 636: Performed by: ORTHOPAEDIC SURGERY

## 2017-02-02 PROCEDURE — 25000128 H RX IP 250 OP 636: Performed by: PHYSICIAN ASSISTANT

## 2017-02-02 PROCEDURE — 97535 SELF CARE MNGMENT TRAINING: CPT | Mod: GO

## 2017-02-02 PROCEDURE — 90662 IIV NO PRSV INCREASED AG IM: CPT | Performed by: ORTHOPAEDIC SURGERY

## 2017-02-02 PROCEDURE — 97116 GAIT TRAINING THERAPY: CPT | Mod: GP | Performed by: PHYSICAL THERAPIST

## 2017-02-02 PROCEDURE — 40000133 ZZH STATISTIC OT WARD VISIT

## 2017-02-02 PROCEDURE — 40000193 ZZH STATISTIC PT WARD VISIT: Performed by: PHYSICAL THERAPIST

## 2017-02-02 PROCEDURE — 36415 COLL VENOUS BLD VENIPUNCTURE: CPT | Performed by: ORTHOPAEDIC SURGERY

## 2017-02-02 PROCEDURE — 97110 THERAPEUTIC EXERCISES: CPT | Mod: GP | Performed by: PHYSICAL THERAPIST

## 2017-02-02 PROCEDURE — 97165 OT EVAL LOW COMPLEX 30 MIN: CPT | Mod: GO

## 2017-02-02 PROCEDURE — 12000007 ZZH R&B INTERMEDIATE

## 2017-02-02 PROCEDURE — 82947 ASSAY GLUCOSE BLOOD QUANT: CPT | Performed by: ORTHOPAEDIC SURGERY

## 2017-02-02 PROCEDURE — 97161 PT EVAL LOW COMPLEX 20 MIN: CPT | Mod: GP | Performed by: PHYSICAL THERAPIST

## 2017-02-02 PROCEDURE — 85018 HEMOGLOBIN: CPT | Performed by: ORTHOPAEDIC SURGERY

## 2017-02-02 PROCEDURE — 25000132 ZZH RX MED GY IP 250 OP 250 PS 637: Performed by: ORTHOPAEDIC SURGERY

## 2017-02-02 PROCEDURE — 99232 SBSQ HOSP IP/OBS MODERATE 35: CPT | Performed by: PHYSICIAN ASSISTANT

## 2017-02-02 RX ORDER — TRAMADOL HYDROCHLORIDE 50 MG/1
50-100 TABLET ORAL EVERY 6 HOURS PRN
Status: DISCONTINUED | OUTPATIENT
Start: 2017-02-02 | End: 2017-02-04 | Stop reason: HOSPADM

## 2017-02-02 RX ORDER — KETOROLAC TROMETHAMINE 15 MG/ML
15 INJECTION, SOLUTION INTRAMUSCULAR; INTRAVENOUS EVERY 6 HOURS PRN
Status: DISPENSED | OUTPATIENT
Start: 2017-02-02 | End: 2017-02-04

## 2017-02-02 RX ADMIN — GABAPENTIN 100 MG: 100 CAPSULE ORAL at 20:20

## 2017-02-02 RX ADMIN — OMEPRAZOLE 20 MG: 20 CAPSULE, DELAYED RELEASE ORAL at 06:23

## 2017-02-02 RX ADMIN — SENNOSIDES AND DOCUSATE SODIUM 1 TABLET: 8.6; 5 TABLET ORAL at 20:20

## 2017-02-02 RX ADMIN — ACETAMINOPHEN 975 MG: 325 TABLET, FILM COATED ORAL at 04:42

## 2017-02-02 RX ADMIN — OXYCODONE HYDROCHLORIDE 5 MG: 5 TABLET ORAL at 14:57

## 2017-02-02 RX ADMIN — CEFAZOLIN SODIUM 2 G: 2 INJECTION, SOLUTION INTRAVENOUS at 07:03

## 2017-02-02 RX ADMIN — RANITIDINE HYDROCHLORIDE 150 MG: 150 TABLET, FILM COATED ORAL at 20:20

## 2017-02-02 RX ADMIN — KETOROLAC TROMETHAMINE 15 MG: 15 INJECTION, SOLUTION INTRAMUSCULAR; INTRAVENOUS at 00:07

## 2017-02-02 RX ADMIN — LISINOPRIL 20 MG: 20 TABLET ORAL at 00:07

## 2017-02-02 RX ADMIN — RANITIDINE HYDROCHLORIDE 150 MG: 150 TABLET, FILM COATED ORAL at 08:37

## 2017-02-02 RX ADMIN — GABAPENTIN 100 MG: 100 CAPSULE ORAL at 14:57

## 2017-02-02 RX ADMIN — INFLUENZA A VIRUS A/CALIFORNIA/7/2009 X-179A (H1N1) ANTIGEN (FORMALDEHYDE INACTIVATED), INFLUENZA A VIRUS A/HONG KONG/4801/2014 X-263B (H3N2) ANTIGEN (FORMALDEHYDE INACTIVATED), AND INFLUENZA B VIRUS B/BRISBANE/60/2008 ANTIGEN (FORMALDEHYDE INACTIVATED) 0.5 ML: 60; 60; 60 INJECTION, SUSPENSION INTRAMUSCULAR at 11:59

## 2017-02-02 RX ADMIN — ENOXAPARIN SODIUM 40 MG: 40 INJECTION SUBCUTANEOUS at 14:58

## 2017-02-02 RX ADMIN — KETOROLAC TROMETHAMINE 15 MG: 15 INJECTION, SOLUTION INTRAMUSCULAR; INTRAVENOUS at 20:23

## 2017-02-02 RX ADMIN — OXYCODONE HYDROCHLORIDE 5 MG: 5 TABLET ORAL at 08:47

## 2017-02-02 RX ADMIN — LISINOPRIL 20 MG: 20 TABLET ORAL at 21:34

## 2017-02-02 RX ADMIN — KETOROLAC TROMETHAMINE 15 MG: 15 INJECTION, SOLUTION INTRAMUSCULAR; INTRAVENOUS at 06:23

## 2017-02-02 RX ADMIN — OXYCODONE HYDROCHLORIDE 10 MG: 5 TABLET ORAL at 20:20

## 2017-02-02 RX ADMIN — ACETAMINOPHEN 975 MG: 325 TABLET, FILM COATED ORAL at 20:19

## 2017-02-02 RX ADMIN — ACETAMINOPHEN 975 MG: 325 TABLET, FILM COATED ORAL at 11:52

## 2017-02-02 RX ADMIN — GABAPENTIN 100 MG: 100 CAPSULE ORAL at 08:37

## 2017-02-02 NOTE — PROGRESS NOTES
[facility] Progress Note  Date of Service: 02/02/2017    Assessment and Plan  Lily Linares is a 66 year old female who presented on 2/1/2017 for scheduled Procedure(s):  ARTHROPLASTY KNEE by Dedrick Ibanez MD and is being followed by the hospital medicine service for co-management of acute and/or chronic perioperative medical problems.    S/p Procedure(s):  ARTHROPLASTY KNEE  - POD #1  - pain control, wound cares, physical therapy, occupational therapy and DVT prophylaxis per orthopedic surgery service    Hypertension goal BP (blood pressure) < 140/90  - continue PTA lisinopril    GERD (gastroesophageal reflux disease)  - continue PTA omeprazole      DVT Prophylaxis: as per orthopedic surgery service - Defer to primary service  Code Status: Full Code    Lines: PIV, without edema/erythema   Watkins catheter: pulled this AM    Discussion: Medically, the patient appears stable.    Disposition: Anticipate discharge 1-2 days. The patient plans to go home with outpatient PT    Attestation:  I have reviewed today's vital signs, notes, medications, labs and imaging.    Vesta Vargas PA-C      Interval History  The patient reports her pain is well controlled.  Denies nausea, vomiting, fever, chills, chest pain, palpitations, shortness of breath, cough, abdominal pain, dysuria, haematuria, urinary retention, and lower extremity swelling/pain.  The patient is passing flatus.  She is voiding freely.    Physical Exam  Temp:  [97.5  F (36.4  C)-99  F (37.2  C)] 97.7  F (36.5  C)  Pulse:  [77] 77  Heart Rate:  [72-93] 72  Resp:  [16-20] 18  BP: (124-168)/(54-97) 129/80 mmHg  SpO2:  [94 %-98 %] 96 %    ROS: 10 point ROS neg other than the symptoms noted above in the HPI.    Weights:   Filed Vitals:    02/01/17 1331 02/01/17 1854   Weight: 86.183 kg (190 lb) 92.5 kg (203 lb 14.8 oz)    Body mass index is 37.29 kg/(m^2).    General: alert and oriented x4, in no acute distress  CV:  Regular rate/rhythm, no appreciable murmur, rub, or gallop  Respiratory: CTA bilaterally, equal chest expansion  GI: Soft, nontender, normoactive BS  Skin: Warm and dry  Musculoskeletal: Negative homans bilaterally.  Non-tender to palpation of posterior calves. No edema to lower extremities.  Neuro: equal  strength    Data    Recent Labs  Lab 02/02/17  0621 02/01/17  1350   WBC  --  8.5   HGB 11.9 14.7   MCV  --  77*   PLT  --  313   INR  --  0.98   POTASSIUM  --  3.7   CR  --  0.75   *  --          Recent Labs  Lab 02/02/17  0621   *        Unresulted Labs Ordered in the Past 30 Days of this Admission     No orders found for last 60 day(s).           Imaging  Recent Results (from the past 24 hour(s))   XR Knee Port Right 1/2 Views    Narrative    KNEE PORTABLE RIGHT ONE TO TWO VIEWS   2/1/2017 6:07 PM     HISTORY: Postoperative total knee    COMPARISON: None.    FINDINGS: There has been a recent right total knee arthroplasty.  Components appear well seated. Surgical drain in place.      Impression    IMPRESSION: Recent right total knee arthroplasty in good condition.    JOEL KNOX MD        I reviewed all new labs and imaging results over the last 24 hours. I personally reviewed no images or EKG's today.    Medications    NaCl 125 mL/hr at 02/01/17 2209       omeprazole  20 mg Oral QAM AC     sodium chloride (PF)  3 mL Intracatheter Q8H     enoxaparin  40 mg Subcutaneous Q24H     acetaminophen  975 mg Oral Q8H     senna-docusate  1-2 tablet Oral BID     ranitidine  150 mg Oral BID     gabapentin  100 mg Oral TID     influenza Vac Split High-Dose  0.5 mL Intramuscular Prior to discharge     lisinopril  20 mg Oral Daily       Vesta Vargas PA-C

## 2017-02-02 NOTE — PLAN OF CARE
"Problem: Goal Outcome Summary  Goal: Goal Outcome Summary  Patient able to rest during night, had dangled and stood for evening shift. CPM in place from about 2100 until 0445, patient tolerates well and states \"it feels good.\" Patient pain controlled with Toradol and scheduled Tylenol. Patient afraid to take anything stronger for pain as she has a history of terrible diarrhea with oxycodone; would be willing to discuss other pain medication options with providers in the morning. Watkins draining well, Hemovac emptied at this time. Ice pack over knee. Capnography remains in place. Patient drinking water, has not wanted anything else to drink during the night. VSS, BP improved with dose of lisinopril that she typically takes at home at HS. Patient doing well.         "

## 2017-02-02 NOTE — PROGRESS NOTES
02/02/17 1000   Quick Adds   Type of Visit Initial Occupational Therapy Evaluation   Living Environment   Lives With spouse   Living Arrangements house   Home Accessibility stairs to enter home  (walk-in shower)   Number of Stairs to Enter Home 2  (no railing)   Number of Stairs Within Home (pt plans on living on main level for first week. )   Self-Care   Regular Exercise yes   Activity/Exercise Type swimming   Activity/Exercise/Self-Care Comment Pt has: shower chair, RTS, reacher and leg .    Functional Level Prior   Ambulation 0-->independent   Transferring 0-->independent   Toileting 0-->independent   Bathing 0-->independent   Dressing 0-->independent   Eating 0-->independent   Communication 0-->understands/communicates without difficulty   Swallowing 0-->swallows foods/liquids without difficulty   Cognition 0 - no cognition issues reported   Fall history within last six months no   General Information   Onset of Illness/Injury or Date of Surgery - Date 02/01/17   Referring Physician Dedrick Ibanez MD   Patient/Family Goals Statement To return home, goal is for tomorrow.    Additional Occupational Profile Info/Pertinent History of Current Problem Right Total Knee Arthroplasty   Precautions/Limitations (knee precautions)   Weight-Bearing Status - RLE weight-bearing as tolerated   Cognitive Status Examination   Orientation orientation to person, place and time   Level of Consciousness alert   Able to Follow Commands WNL/WFL   Pain Assessment   Patient Currently in Pain Yes, see Vital Sign flowsheet  (controlled)   Transfer Skill: Bed to Chair/Chair to Bed   Level of Anderson: Bed to Chair stand-by assist   Transfer Skill: Sit to Stand   Level of Anderson: Sit/Stand stand-by assist   Physical Assist/Nonphysical Assist: Sit/Stand supervision   Transfer Skill: Sit to Stand weight-bearing as tolerated   Assistive Device for Transfer: Sit/Stand rolling walker   Transfer Skill: Toilet Transfer   Level  "of Pender: Toilet stand-by assist   Physical Assist/Nonphysical Assist: Toilet supervision   Weight-Bearing Restrictions: Toilet weight-bearing as tolerated   Assistive Device rolling walker   Upper Body Dressing   Level of Pender: Dress Upper Body independent   Lower Body Dressing   Level of Pender: Dress Lower Body stand-by assist   Physical Assist/Nonphysical Assist: Dress Lower Body supervision   Instrumental Activities of Daily Living (IADL)   IADL Comments  can assist with IADLs upon discharge.    General Therapy Interventions   Planned Therapy Interventions ADL retraining   Clinical Impression   Criteria for Skilled Therapeutic Interventions Met yes, treatment indicated   OT Diagnosis decreased independence with ADLs   Influenced by the following impairments decreased ROM in R knee   Assessment of Occupational Performance 1-3 Performance Deficits   Identified Performance Deficits dressing, bathing   Clinical Decision Making (Complexity) Low complexity   Therapy Frequency daily   Predicted Duration of Therapy Intervention (days/wks) 1x treat   Anticipated Discharge Disposition Home with Assist   Risks and Benefits of Treatment have been explained. Yes   Patient, Family & other staff in agreement with plan of care Yes   [facility] AM-PAC  \"6 Clicks\" Daily Activity Inpatient Short Form   1. Putting on and taking off regular lower body clothing? 3 - A Little   2. Bathing (including washing, rinsing, drying)? 3 - A Little   3. Toileting, which includes using toilet, bedpan or urinal? 4 - None   4. Putting on and taking off regular upper body clothing? 4 - None   5. Taking care of personal grooming such as brushing teeth? 4 - None   6. Eating meals? 4 - None   Daily Activity Raw Score (Score out of 24.Lower scores equate to lower levels of function) 22   Total Evaluation Time   Total Evaluation Time (Minutes) 8     Jeny Eaton OTR/L    "

## 2017-02-02 NOTE — CONSULTS
Care Transitions:  Met with the patient for discharge needs.  She will return home and have outpatient PT.  There are no discharge needs identified.  If immediate needs arise, please contact Care Management at [phone].   Maria Fernanda Jorgensen RN, Care Coordinator

## 2017-02-02 NOTE — PLAN OF CARE
Problem: Goal Outcome Summary  Goal: Goal Outcome Summary  PT-  Evaluation completed, RX initiated.  Pt reporting minimal pain.  Pt ambulated  100 feet x1 with RW, SBA. Steady gait     REC- return home w/ assistance from her  as needed; outpatient PT

## 2017-02-02 NOTE — PROGRESS NOTES
"SPIRITUAL HEALTH SERVICES  SPIRITUAL ASSESSMENT Progress Note  [facility] [place] - Med / Surg    PRIMARY FOCUS:     Emotional/spiritual/Protestant distress    Support for coping    ILLNESS CIRCUMSTANCES:   Reviewed documentation. Reflective conversation shared with pt, Sabrina, which integrated elements of illness and family narratives.     Context of Serious Illness/Symptom(s) - TKA    Resources for Support - Sabrina stated that her , who is a  for [employer], had just stopped by because he was picking up a patient.  She also mentioned a daughter and , as well as a grand-daughter who recently moved into homes near theirs.    DISTRESS:     Emotional/Existential/Relational Distress - Sabrina stated that no matter what is going on she has learned to accept what comes and learn through the experience.       Spiritual/Druze Distress - None addressed    Social/Cultural/Economic Distress - Sabrina stated that she was retired but there have been some financial challenges.  She stated that the living situation for her daughter and extended family has been recently resolved and she is very thankful for that.    SPIRITUAL/Mandaeism COPING:     Anglican/Telma - Not addressed    Spiritual Practice(s) - Sabrina welcomed prayer    Emotional/Existential/Relational Connections - Sabrina spoke of working for many years at [employer] and of the long history her family has in the [place] with ancestors settling there in the 1840's.      GOALS OF CARE:    Goals of Care - Sabrina stated she is \"ready to get out\" and get on with life    Meaning/Sense-Making - Sabrina's reflections were on the importance of making life decisions and if they don't work out the best, learn from them Don't dwell on them.  \"Pity parties don't help and make life miserable for you and for those around you.\"     PLAN: No follow up visit planned unless requested by pt or staff.    Daniel Coreas M.A, [facility]  Staff   Pager [phone]    "

## 2017-02-02 NOTE — PROGRESS NOTES
"[place] [facility] ADMISSION NOTE    Patient admitted to room 2400 at approximately 1845 via cart from surgery. Patient was accompanied by transport tech.     Verbal SBAR report received from [facility] prior to patient arrival.     Patient trasferred to bed via air beverley. Patient alert and oriented X 3. The patient is not having any pain.  . Admission vital signs: Blood pressure 138/69, pulse 77, temperature 97.5  F (36.4  C), temperature source Oral, resp. rate 18, height 1.575 m (5' 2\"), weight 92.5 kg (203 lb 14.8 oz), SpO2 95 %, not currently breastfeeding. Patient was oriented to plan of care, call light, bed controls, tv, telephone, bathroom and visiting hours.     The following safety risks were identified during admission: fall. Yellow risk band applied: YES.     Kizzy Black      "

## 2017-02-02 NOTE — ANESTHESIA POSTPROCEDURE EVALUATION
Patient: Lily Linares    Procedure(s):  Right Total Knee Arthroplasty - Wound Class: I-Clean    Diagnosis:degenerative joint disease  Diagnosis Additional Information: No value filed.    Anesthesia Type:  No value filed.    Note:  Anesthesia Post Evaluation    Patient location during evaluation: Bedside  Patient participation: Able to participate in evaluation but full recovery from regional anesthesia has not yet ocurrred but is anticipated to occur within 48 hours  Level of consciousness: awake and alert  Pain management: adequate  Airway patency: patent  Cardiovascular status: acceptable  Respiratory status: acceptable  Hydration status: acceptable  PONV: none     Anesthetic complications: None          Last vitals:  Filed Vitals:    02/01/17 1800 02/01/17 1815 02/01/17 1830   BP: 126/62 135/65 142/65   Pulse:      Temp:  37.1  C (98.7  F)    Resp: 16 16 20   SpO2: 96%  95%         Electronically Signed By: Juan Carlos Stein CRNA, APRN CRNA  February 1, 2017  6:47 PM

## 2017-02-02 NOTE — PLAN OF CARE
Problem: Knee Replacement, Total (Adult)  Goal: Signs and Symptoms of Listed Potential Problems Will be Absent or Manageable (Knee Replacement, Total)  Signs and symptoms of listed potential problems will be absent or manageable by discharge/transition of care (reference Knee Replacement, Total (Adult) CPG).   Outcome: Improving  A&Ox4, VSS on RA. Right knee dressing CDI. HV x1, Sensation nearly back, still says mildly dull but good dorsi/plantar strength. +PP.Dangled & stood at bedside w/A1. Watkins w/adequate OP. Patient is hesitant to start oxycodone, would prefer to avoid if possible-received order from MD on call to implement toradol q6hr PRN. CPM in place, tolerating at 37, patient wishes to keep on tonight.

## 2017-02-02 NOTE — PLAN OF CARE
"Problem: Knee Replacement, Total (Adult)  Goal: Signs and Symptoms of Listed Potential Problems Will be Absent or Manageable (Knee Replacement, Total)  Signs and symptoms of listed potential problems will be absent or manageable by discharge/transition of care (reference Knee Replacement, Total (Adult) CPG).   Outcome: Improving  Patient reports \"Pain is comfortably managed.\"  Has taken oxycodone 5 mg orally x 2 today.  No reports of nausea.  Tolerating regular diet.  Passing gas; but No BM as of yet.  Voiding well.  Up to chair for several hours this morning.  Ambulated in hallway x 2 today with PT.    Removed dressing and drain at 1700.  Has some bruising in right upper thigh and lateral knee area.  New dressing applied.  Ice to knee.        "

## 2017-02-02 NOTE — PROGRESS NOTES
"[facility] Orthopaedics Progress Note      Post-operative Day: 1 Day Post-Op    Procedure(s):  Right Total Knee Arthroplasty - Wound Class: I-Clean      Subjective:  Patient doing well. Really no pain at rest.  Patient concerned about using oxycodone because when she broke her elbow years ago she was given 1 dose of oxycodone and then got diarrhea.  She took tramadol pre op and \"it did nothing for pain\".  She has never tried norco or dilaudid so these may be an option if oxycodone causing side effects.  Pain: minimal  Chest pain, SOB:  No      Objective:  Blood pressure 129/80, pulse 77, temperature 97.7  F (36.5  C), temperature source Oral, resp. rate 18, height 1.575 m (5' 2\"), weight 92.5 kg (203 lb 14.8 oz), SpO2 96 %, not currently breastfeeding.    Patient Vitals for the past 24 hrs:   BP Temp Temp src Pulse Heart Rate Resp SpO2 Height Weight   02/02/17 0200 129/80 mmHg 97.7  F (36.5  C) Oral - 72 18 96 % - -   02/01/17 2100 (!) 164/94 mmHg - - - 87 18 95 % - -   02/01/17 2030 (!) 165/97 mmHg - - - 85 18 94 % - -   02/01/17 2000 167/78 mmHg 97.7  F (36.5  C) Oral - 83 18 94 % - -   02/01/17 1930 138/78 mmHg - - - 83 18 94 % - -   02/01/17 1854 138/69 mmHg 97.5  F (36.4  C) Oral - 87 18 95 % 1.575 m (5' 2\") 92.5 kg (203 lb 14.8 oz)   02/01/17 1830 142/65 mmHg - - - 88 20 95 % - -   02/01/17 1815 135/65 mmHg 98.7  F (37.1  C) Oral - 93 16 - - -   02/01/17 1800 126/62 mmHg - - - 86 16 96 % - -   02/01/17 1745 130/58 mmHg - - - 85 16 97 % - -   02/01/17 1727 124/54 mmHg 99  F (37.2  C) Oral - 89 16 98 % - -   02/01/17 1331 168/80 mmHg 98  F (36.7  C) Oral 77 - 18 94 % 1.575 m (5' 2\") 86.183 kg (190 lb)       Wt Readings from Last 4 Encounters:   02/01/17 92.5 kg (203 lb 14.8 oz)   01/24/17 88.451 kg (195 lb)   12/20/16 89.359 kg (197 lb)   08/19/16 91.173 kg (201 lb)         Motor function, sensation, and circulation intact   Yes  Wound status: incisions are clean dry and intact. Yes, post op dressings CDI.  " hemovac intact.  Calf tenderness: Bilateral  No    Pertinent Labs   Lab Results: personally reviewed.     Recent Labs   Lab Test  02/02/17   0621  02/01/17   1350  04/12/16   0759  01/13/14   0839   INR   --   0.98   --    --    HGB  11.9  14.7   --   14.2   HCT   --   43.6   --   41.2   MCV   --   77*   --   78   PLT   --   313   --   258   NA   --    --   140  140       Plan: Anticoagulation protocol: Lovenox inpatient and then  mg daily at discharge  x 42  days            Pain medications:  IV toradol as scheduled, oxycodone (patient willing to try it and monitor for GI complaints) , tylenol and vistaril and tramadol also available.   We discussed the importance of pain control in order to participate in PT/OT for recovery.            Weight bearing status:  WBAT            Disposition:  Home in 1-2 days. Patient would like home healthcare PT for short duration and then [facility]  S.S. Consult today to help with D/C planning  Nursing to change dressings today with hemovac removal.  No additional adhesives to be put on knee (including bandages).  Only use dry 4x4 guaze over prineo and then apply 4 inch ACE to hold dressings in place and place TEDs on              Continue cares and rehabilitation     Report completed by:  Parveen Dias PA-C  Date: 2/2/2017  Time: 8:00 AM

## 2017-02-02 NOTE — PLAN OF CARE
Problem: Goal Outcome Summary  Goal: Goal Outcome Summary  OT: Eval complete and treatment initiated. Pt able to demo all ADLs with SBA with no observed difficulty. Pt with no further ADL concerns at this time and all IP OT goals met.     REC: Home with assist from  as needed.

## 2017-02-02 NOTE — ANESTHESIA CARE TRANSFER NOTE
Patient: Lily Linares    Procedure(s):  Right Total Knee Arthroplasty - Wound Class: I-Clean    Diagnosis: degenerative joint disease  Diagnosis Additional Information: No value filed.    Anesthesia Type:   No value filed.     Note:  Airway :Nasal Cannula  Patient transferred to:PACU        Vitals: (Last set prior to Anesthesia Care Transfer)    CRNA VITALS  2/1/2017 1651 - 2/1/2017 1751      2/1/2017             Pulse: 83    SpO2: 95 %                Electronically Signed By: Juan Carlos Stein CRNA, APRN CRNA  February 1, 2017  6:48 PM

## 2017-02-02 NOTE — PROGRESS NOTES
02/02/17 0800   Quick Adds   Type of Visit Initial PT Evaluation   Living Environment   Lives With spouse   Living Arrangements house   Home Accessibility stairs to enter home;stairs within home   Number of Stairs to Enter Home 2   Stair Railings at Home (no railing)   Self-Care   Regular Exercise yes   Activity/Exercise Type swimming   Functional Level Prior   Ambulation 0-->independent   Transferring 0-->independent   Toileting 0-->independent   Bathing 0-->independent   Dressing 0-->independent   Eating 0-->independent   Communication 0-->understands/communicates without difficulty   Swallowing 0-->swallows foods/liquids without difficulty   Cognition 0 - no cognition issues reported   Fall history within last six months no   Prior Functional Level Comment PLOf- Pt indep. with ambulation  with no device, pain.   General Information   Onset of Illness/Injury or Date of Surgery - Date 02/01/17   Referring Physician Comfort   Patient/Family Goals Statement S-  Pt w/ goal of Dc to home   Pertinent History of Current Problem (include personal factors and/or comorbidities that impact the POC) degenerative arthritis,   Total knee arthoplasty (Right)   General Observations Pt alert- reporting  minimal pain   Integumentary/Edema   Integumentary/Edema Comments NT- compression wrap, hemovac in place   Right Knee Extension/Flexion ROM   Right Knee Extension/Flexion AROM - degrees (5-65 degrees, AAROM measured  in supine)   MMT: Knee, Rehab Eval   Knee Flexion - Right Side (3/5) fair, right   Knee Extension - Right Side (3/5) fair, right   Transfer Skills   Transfer Comments SBA supine> sitting   Gait   Gait Gait Skill;Gait Analysis   Gait Comments Pt ambulated  100 feet x1 withRW, SBA. Steady gait   Balance   Balance Comments good dynamic standing balance w/ walker use   Sensory Examination   Sensory Perception no deficits were identified   General Therapy Interventions   Planned Therapy Interventions gait  "training;ROM;strengthening;home program guidelines   Clinical Impression   Criteria for Skilled Therapeutic Intervention yes, treatment indicated   PT Diagnosis R  TKA   Influenced by the following impairments Pain, decreased ROm/ strength     Functional limitations due to impairments Altered mobility   Clinical Presentation Stable/Uncomplicated   Clinical Presentation Rationale Pt indep. at baseline, appears motivated to return to PLOF.  No comorbididites   Clinical Decision Making (Complexity) Low complexity   Therapy Frequency` 2 times/day   Predicted Duration of Therapy Intervention (days/wks) 1 day   Anticipated Equipment Needs at Discharge (Pt owns walker, SEC)   Anticipated Discharge Disposition Home with Home Therapy;Home with Outpatient Therapy   Risk & Benefits of therapy have been explained Yes   Patient, Family & other staff in agreement with plan of care Yes   [facility] AM-PAC  \"6 Clicks\" V.2 Basic Mobility Inpatient Short Form   1. Turning from your back to your side while in a flat bed without using bedrails? 4 - None   2. Moving from lying on your back to sitting on the side of a flat bed without using bedrails? 4 - None   3. Moving to and from a bed to a chair (including a wheelchair)? 3 - A Little   4. Standing up from a chair using your arms (e.g., wheelchair, or bedside chair)? 4 - None   5. To walk in hospital room? 3 - A Little   6. Climbing 3-5 steps with a railing? 3 - A Little   Basic Mobility Raw Score (Score out of 24.Lower scores equate to lower levels of function) 21     "

## 2017-02-03 ENCOUNTER — APPOINTMENT (OUTPATIENT)
Dept: PHYSICAL THERAPY | Facility: CLINIC | Age: 67
DRG: 470 | End: 2017-02-03
Attending: ORTHOPAEDIC SURGERY
Payer: COMMERCIAL

## 2017-02-03 LAB
GLUCOSE SERPL-MCNC: 97 MG/DL (ref 70–99)
HGB BLD-MCNC: 12.6 G/DL (ref 11.7–15.7)
TROPONIN I SERPL-MCNC: NORMAL UG/L (ref 0–0.04)

## 2017-02-03 PROCEDURE — 97110 THERAPEUTIC EXERCISES: CPT | Mod: GP | Performed by: PHYSICAL THERAPIST

## 2017-02-03 PROCEDURE — 25000132 ZZH RX MED GY IP 250 OP 250 PS 637

## 2017-02-03 PROCEDURE — 12000007 ZZH R&B INTERMEDIATE

## 2017-02-03 PROCEDURE — 25000128 H RX IP 250 OP 636: Performed by: PHYSICIAN ASSISTANT

## 2017-02-03 PROCEDURE — 36415 COLL VENOUS BLD VENIPUNCTURE: CPT | Performed by: PHYSICIAN ASSISTANT

## 2017-02-03 PROCEDURE — 36415 COLL VENOUS BLD VENIPUNCTURE: CPT | Performed by: ORTHOPAEDIC SURGERY

## 2017-02-03 PROCEDURE — 84484 ASSAY OF TROPONIN QUANT: CPT | Performed by: PHYSICIAN ASSISTANT

## 2017-02-03 PROCEDURE — 25000132 ZZH RX MED GY IP 250 OP 250 PS 637: Performed by: ORTHOPAEDIC SURGERY

## 2017-02-03 PROCEDURE — 93005 ELECTROCARDIOGRAM TRACING: CPT

## 2017-02-03 PROCEDURE — 25000128 H RX IP 250 OP 636: Performed by: ORTHOPAEDIC SURGERY

## 2017-02-03 PROCEDURE — 40000193 ZZH STATISTIC PT WARD VISIT: Performed by: PHYSICAL THERAPIST

## 2017-02-03 PROCEDURE — 82947 ASSAY GLUCOSE BLOOD QUANT: CPT | Performed by: ORTHOPAEDIC SURGERY

## 2017-02-03 PROCEDURE — 99232 SBSQ HOSP IP/OBS MODERATE 35: CPT | Performed by: PHYSICIAN ASSISTANT

## 2017-02-03 PROCEDURE — 85018 HEMOGLOBIN: CPT | Performed by: ORTHOPAEDIC SURGERY

## 2017-02-03 RX ORDER — METOCLOPRAMIDE HYDROCHLORIDE 5 MG/ML
5 INJECTION INTRAMUSCULAR; INTRAVENOUS EVERY 6 HOURS PRN
Status: DISCONTINUED | OUTPATIENT
Start: 2017-02-03 | End: 2017-02-04 | Stop reason: HOSPADM

## 2017-02-03 RX ORDER — PROCHLORPERAZINE 25 MG
12.5 SUPPOSITORY, RECTAL RECTAL EVERY 12 HOURS PRN
Status: DISCONTINUED | OUTPATIENT
Start: 2017-02-03 | End: 2017-02-04 | Stop reason: HOSPADM

## 2017-02-03 RX ORDER — NITROGLYCERIN 0.4 MG/1
0.4 TABLET SUBLINGUAL EVERY 5 MIN PRN
Status: DISCONTINUED | OUTPATIENT
Start: 2017-02-03 | End: 2017-02-04 | Stop reason: HOSPADM

## 2017-02-03 RX ORDER — PROCHLORPERAZINE MALEATE 5 MG
5 TABLET ORAL EVERY 6 HOURS PRN
Status: DISCONTINUED | OUTPATIENT
Start: 2017-02-03 | End: 2017-02-04 | Stop reason: HOSPADM

## 2017-02-03 RX ORDER — METOCLOPRAMIDE 5 MG/1
5 TABLET ORAL EVERY 6 HOURS PRN
Status: DISCONTINUED | OUTPATIENT
Start: 2017-02-03 | End: 2017-02-04 | Stop reason: HOSPADM

## 2017-02-03 RX ORDER — NITROGLYCERIN 0.4 MG/1
TABLET SUBLINGUAL
Status: COMPLETED
Start: 2017-02-03 | End: 2017-02-03

## 2017-02-03 RX ADMIN — KETOROLAC TROMETHAMINE 15 MG: 15 INJECTION, SOLUTION INTRAMUSCULAR; INTRAVENOUS at 09:26

## 2017-02-03 RX ADMIN — OXYCODONE HYDROCHLORIDE 10 MG: 5 TABLET ORAL at 00:43

## 2017-02-03 RX ADMIN — ACETAMINOPHEN 975 MG: 325 TABLET, FILM COATED ORAL at 20:09

## 2017-02-03 RX ADMIN — SODIUM CHLORIDE 500 ML: 9 INJECTION, SOLUTION INTRAVENOUS at 08:17

## 2017-02-03 RX ADMIN — LISINOPRIL 20 MG: 20 TABLET ORAL at 22:40

## 2017-02-03 RX ADMIN — KETOROLAC TROMETHAMINE 15 MG: 15 INJECTION, SOLUTION INTRAMUSCULAR; INTRAVENOUS at 16:31

## 2017-02-03 RX ADMIN — NITROGLYCERIN: 0.4 TABLET SUBLINGUAL at 07:47

## 2017-02-03 RX ADMIN — ENOXAPARIN SODIUM 40 MG: 40 INJECTION SUBCUTANEOUS at 16:27

## 2017-02-03 RX ADMIN — OXYCODONE HYDROCHLORIDE 10 MG: 5 TABLET ORAL at 04:55

## 2017-02-03 RX ADMIN — ACETAMINOPHEN 975 MG: 325 TABLET, FILM COATED ORAL at 12:01

## 2017-02-03 RX ADMIN — SENNOSIDES AND DOCUSATE SODIUM 2 TABLET: 8.6; 5 TABLET ORAL at 09:26

## 2017-02-03 RX ADMIN — ACETAMINOPHEN 975 MG: 325 TABLET, FILM COATED ORAL at 04:45

## 2017-02-03 NOTE — PROGRESS NOTES
"Pt reports feeling \"woozy\" after nitro.  BP 73/41 P 52.  O2 sats 100% on 2 LPM.  Supine, HOB 30 degrees, legs elevated.    "

## 2017-02-03 NOTE — PROGRESS NOTES
"Pt reports chest pain less, still \"kind of there\"  Back pain gone.  EKG done, lab at bedside now.   "

## 2017-02-03 NOTE — PROGRESS NOTES
"[facility] Medicine Progress Note  Date of Service: 02/03/2017    Assessment and Plan  Lily Linares is a 66 year old female who presented on 2/1/2017 for scheduled Procedure(s):  ARTHROPLASTY KNEE by Dedrick Ibanez MD and is being followed by the hospital medicine service for co-management of acute and/or chronic perioperative medical problems.    S/p Procedure(s):  ARTHROPLASTY KNEE  - POD #2  - pain control, wound cares, physical therapy, occupational therapy and DVT prophylaxis per orthopedic surgery service    Chest Pressure, paresthesias morning of 2/3  Reportedly awoke and tingling sensation was self limited.  Nonetheless, reported to RN that she \"just didn't feel right.\"  30-minutes later she developed chest pressure which radiated to her jaw.  Please see progress note for further details.  Patient is pressure free as of 11am 2/3, but would like to stay in house for an additional day of monitoring.  Reports a brother with a massive MI at age 42.    - telemetry today  - troponin x3  - recommend outpatient follow up with PCP    Hypertension goal BP (blood pressure) < 140/90  - continue PTA lisinopril    GERD (gastroesophageal reflux disease)  - continue PTA omeprazole    DVT Prophylaxis: as per orthopedic surgery service - Defer to primary service  Code Status: Full Code    Lines: PIV, without edema/erythema   Watkins catheter: not indicated    Discussion: Medically, the patient appears stable.    Disposition: Anticipate discharge tomorrow morning.  The patient plans to discharge home with outpatient physical therapy.    Attestation:  I have reviewed today's vital signs, notes, medications, labs and imaging.    Vesta Vargas PA-C      Interval History  Reports that the pain to her knee is well controlled.  She, however, reports a significant amount of anxiety in regards to this morning's event in regards to her heart.  While she initially felt comfortable discharging " "today, she now would rather remain an additional day for continued monitoring.  She reports that the chest pressure has now resolved, but seems to feel as though she still has some residual \"ache\".  Denies nausea, vomiting, fever, chills, changes to appetite, dizziness, chest pain, palpitations, SOB, cough, abdominal pain, dysuria, hematuria, and lower extremity pain/swelling.  She has not had a BM, but continues to pass gas.  Voiding freely.    Physical Exam  Temp:  [97.7  F (36.5  C)-99  F (37.2  C)] 97.7  F (36.5  C)  Pulse:  [70] 70  Heart Rate:  [68-78] 71  Resp:  [16-18] 16  BP: (103-149)/(62-77) 145/68 mmHg  SpO2:  [95 %-99 %] 96 %    ROS: 10 point ROS neg other than the symptoms noted above in the HPI.    Weights:   Filed Vitals:    02/01/17 1331 02/01/17 1854   Weight: 86.183 kg (190 lb) 92.5 kg (203 lb 14.8 oz)    Body mass index is 37.29 kg/(m^2).    General: alert and oriented x4, in no acute distress  CV: Non-reproducible chest wall tenderness. Regular rate/rhythm, no appreciable murmur, rub, or gallop  Respiratory: CTA bilaterally, equal chest expansion  GI: Soft, nontender, normoactive BS  Skin: Warm and dry  Musculoskeletal: Negative homans bilaterally.  Non-tender to palpation of posterior calves. No edema to lower extremities.  Neuro: equal  strength    Data    Recent Labs  Lab 02/03/17  0632 02/02/17  0621 02/01/17  1350   WBC  --   --  8.5   HGB 12.6 11.9 14.7   MCV  --   --  77*   PLT  --   --  313   INR  --   --  0.98   POTASSIUM  --   --  3.7   CR  --   --  0.75   GLC 97 112*  --          Recent Labs  Lab 02/03/17  0632 02/02/17  0621   GLC 97 112*        Unresulted Labs Ordered in the Past 30 Days of this Admission     No orders found from 12/4/2016 to 2/2/2017.           Imaging  No results found for this or any previous visit (from the past 24 hour(s)).     I reviewed all new labs and imaging results over the last 24 hours. I personally reviewed the EKG tracing showing NSR, no change " from previous study.    Medications       omeprazole  20 mg Oral QAM AC     sodium chloride (PF)  3 mL Intracatheter Q8H     enoxaparin  40 mg Subcutaneous Q24H     acetaminophen  975 mg Oral Q8H     senna-docusate  1-2 tablet Oral BID     ranitidine  150 mg Oral BID     gabapentin  100 mg Oral TID     lisinopril  20 mg Oral Daily       Vesta Vargas PA-C

## 2017-02-03 NOTE — PLAN OF CARE
Problem: Goal Outcome Summary  Goal: Goal Outcome Summary  PT-  Spoke w/ RN- trop. And EKG negative, pt w/ no further episodes of chest pain.   Pt reports minimal knee pain, completed mobility w/ SBA. Able to ambulate 100 feet x1 with RW, SBA. participated w/ TKA exercises.   AROM Right knee  5- 82 degrees.   Pt w/ no c/o chest pain      REC-Dc to home; planning on outpatient PT

## 2017-02-03 NOTE — PROGRESS NOTES
02/03/17 0900   Signing Clinician's Name / Credentials   Signing clinician's name / credentials Ana Rosa Brown PT   Quick Adds   Rehab Discipline PT   Additional Documentation   Rehab Comments AM-  cancel- pt resting; episode of chest pain this AM   PT Plan will  check on pt's status this afternoon

## 2017-02-03 NOTE — PLAN OF CARE
Problem: Goal Outcome Summary  Goal: Goal Outcome Summary  Outcome: Improving  A&Ox4, VSS on RA. Right knee dressing CDI. CMS intact. Tolerating CPM at 35. SBA/walker. Tolerating diet, voiding adequately. +gas, no BM.  Pain well controlled w/oxycodone, tylenol & use of ice. DC today vs. Tomorrow w/home PT.

## 2017-02-03 NOTE — PROGRESS NOTES
Pt used call light to report chest pain at 0737 described as heaviness and dull ache between her shoulder blades.  /63 P 79.  O2 sats 98% on room air, O2 applied at 2 LPM.  EKG ordered, Vesta HOSKINS updated and at bedside at this time.

## 2017-02-03 NOTE — PROGRESS NOTES
"Pt stated she did have jaw pain which has subsided.  Still feels \"awful\" but chest pain subsided.  Denies nausea. /50 P 67  "

## 2017-02-03 NOTE — PROGRESS NOTES
"Patient awoke to sensation of throat constriction, SOB & tingling to lips.  All VSS, 99% RA.no new meds added to regimen overnight? Left cont pulse ox in place to monitor for delayed allergic reaction.   -After about 5min tingling to lips resolved & denies SOB, says \"she doesn't feel right.\"  -No rashes/swelling noted, will continue to closely monitor.  "

## 2017-02-03 NOTE — PROGRESS NOTES
[facility]    Hospital Medicine   Care Update  Date of Service: 2/3/2017     I was called due to chest pressure.    Patient awoke at 6:45am with sensation of paresthesias to lips.  Around 7:15 noted onset of acute chest pressure, radiating to jaw.  Denies nausea, vomiting, palpitations, SOB, cough, and numbness/paresthesias to hand and feet.  Patient has 1 brother whom had a massive MI at age 42.      Plan: STAT EKG  STAT troponin   Order sublingual nitro      Vesta Vargas PA-C

## 2017-02-03 NOTE — PROGRESS NOTES
"[facility] Orthopaedics Progress Note      Post-operative Day: 2 Days Post-Op    Procedure(s):  Right Total Knee Arthroplasty - Wound Class: I-Clean      Subjective:    Pain: moderate   Chest pain around 0700 this am with jaw pain/tingling. Was given nitro resulting in relief of majority of symptoms. Still 'doesn't feel right/well'. Pain moderate in knee. Denies cardiac hx, but FH sig for brother with cardiac hx and death ultimately at age 50. No hx of VTE. Hospitalist aware and has EKG/trops ordered.     Objective:  Blood pressure 146/68, pulse 78, temperature 97.7  F (36.5  C), temperature source Oral, resp. rate 16, height 1.575 m (5' 2\"), weight 92.5 kg (203 lb 14.8 oz), SpO2 100 %, not currently breastfeeding.    Patient Vitals for the past 24 hrs:   BP Temp Temp src Pulse Heart Rate Resp SpO2   02/03/17 0930 146/68 mmHg - - 78 - - 100 %   02/03/17 0838 119/64 mmHg - - 68 - - 100 %   02/03/17 0822 111/50 mmHg - - 67 - 16 99 %   02/03/17 0809 (!) 82/42 mmHg - - 56 - - 99 %   02/03/17 0801 (!) 87/40 mmHg - - 53 - - -   02/03/17 0756 (!) 73/41 mmHg - - 52 - - 100 %   02/03/17 0747 136/68 mmHg - - 71 - 18 -   02/03/17 0740 - - - - - - 100 %   02/03/17 0737 149/63 mmHg - - 79 - - 98 %   02/03/17 0728 145/68 mmHg 97.7  F (36.5  C) Oral 70 - 16 96 %   02/03/17 0641 149/77 mmHg - - - 71 - 99 %   02/03/17 0450 - - - - - 16 -   02/03/17 0045 - - - - - 18 -   02/02/17 2316 128/62 mmHg 97.9  F (36.6  C) Oral - 71 18 95 %   02/02/17 1940 148/67 mmHg 99  F (37.2  C) Oral - 71 18 97 %   02/02/17 1544 103/69 mmHg 98.6  F (37  C) Oral - 71 18 97 %   02/02/17 1149 131/74 mmHg 97.7  F (36.5  C) Oral - 68 18 95 %       Wt Readings from Last 4 Encounters:   02/01/17 92.5 kg (203 lb 14.8 oz)   01/24/17 88.451 kg (195 lb)   12/20/16 89.359 kg (197 lb)   08/19/16 91.173 kg (201 lb)     Patient pale, alert and oriented x4. Appears in mild discomfort.     Motor function, sensation, and circulation intact   Yes  Wound status: incisions " are clean dry and intact. Yes  Calf tenderness: Bilateral  No    Pertinent Labs   Lab Results: personally reviewed.     Recent Labs   Lab Test  02/03/17   0632  02/02/17   0621  02/01/17   1350  04/12/16   0759  01/13/14   0839   INR   --    --   0.98   --    --    HGB  12.6  11.9  14.7   --   14.2   HCT   --    --   43.6   --   41.2   MCV   --    --   77*   --   78   PLT   --    --   313   --   258   NA   --    --    --   140  140       Plan: Anticoagulation protocol: Lovenox inpatient and then  mg daily at discharge  x 42  Days - or as determined by hospitalist in accordance with current cardiac work up            Pain medications: tylenol            Weight bearing status:  WBAT            Disposition:  Pending cardiac work up. Plan is for home when medically stable, pain controlled po and passing PT goals.              Continue cares and rehabilitation     Report completed by:  Luh Morales PA-C, ADITI  Date: 2/3/2017  Time: 11:43 AM

## 2017-02-04 ENCOUNTER — APPOINTMENT (OUTPATIENT)
Dept: PHYSICAL THERAPY | Facility: CLINIC | Age: 67
DRG: 470 | End: 2017-02-04
Attending: ORTHOPAEDIC SURGERY
Payer: COMMERCIAL

## 2017-02-04 VITALS
SYSTOLIC BLOOD PRESSURE: 156 MMHG | HEIGHT: 62 IN | WEIGHT: 203.93 LBS | DIASTOLIC BLOOD PRESSURE: 78 MMHG | BODY MASS INDEX: 37.53 KG/M2 | HEART RATE: 77 BPM | OXYGEN SATURATION: 97 % | TEMPERATURE: 98.1 F | RESPIRATION RATE: 16 BRPM

## 2017-02-04 LAB — PLATELET # BLD AUTO: 245 10E9/L (ref 150–450)

## 2017-02-04 PROCEDURE — 40000193 ZZH STATISTIC PT WARD VISIT: Performed by: PHYSICAL THERAPIST

## 2017-02-04 PROCEDURE — 36415 COLL VENOUS BLD VENIPUNCTURE: CPT | Performed by: ORTHOPAEDIC SURGERY

## 2017-02-04 PROCEDURE — 97116 GAIT TRAINING THERAPY: CPT | Mod: GP | Performed by: PHYSICAL THERAPIST

## 2017-02-04 PROCEDURE — 99232 SBSQ HOSP IP/OBS MODERATE 35: CPT | Performed by: FAMILY MEDICINE

## 2017-02-04 PROCEDURE — 97110 THERAPEUTIC EXERCISES: CPT | Mod: GP | Performed by: PHYSICAL THERAPIST

## 2017-02-04 PROCEDURE — 99207 ZZC CDG-CHARGE/DX NOT SELECTED BY PROVIDER: CPT | Performed by: FAMILY MEDICINE

## 2017-02-04 PROCEDURE — 85049 AUTOMATED PLATELET COUNT: CPT | Performed by: ORTHOPAEDIC SURGERY

## 2017-02-04 PROCEDURE — 97530 THERAPEUTIC ACTIVITIES: CPT | Mod: GP | Performed by: PHYSICAL THERAPIST

## 2017-02-04 PROCEDURE — 25000132 ZZH RX MED GY IP 250 OP 250 PS 637: Performed by: ORTHOPAEDIC SURGERY

## 2017-02-04 RX ORDER — ACETAMINOPHEN 325 MG/1
975 TABLET ORAL EVERY 8 HOURS
Qty: 100 TABLET | Refills: 1 | Status: SHIPPED | OUTPATIENT
Start: 2017-02-04 | End: 2017-08-04

## 2017-02-04 RX ADMIN — ACETAMINOPHEN 975 MG: 325 TABLET, FILM COATED ORAL at 11:55

## 2017-02-04 RX ADMIN — OMEPRAZOLE 20 MG: 20 CAPSULE, DELAYED RELEASE ORAL at 07:47

## 2017-02-04 RX ADMIN — ACETAMINOPHEN 975 MG: 325 TABLET, FILM COATED ORAL at 03:28

## 2017-02-04 NOTE — PLAN OF CARE
"Problem: Knee Replacement, Total (Adult)  Goal: Signs and Symptoms of Listed Potential Problems Will be Absent or Manageable (Knee Replacement, Total)  Signs and symptoms of listed potential problems will be absent or manageable by discharge/transition of care (reference Knee Replacement, Total (Adult) CPG).   Outcome: Improving  Patient taking scheduled tylenol and ice for right knee discomfort. Dressing CDI, CMS intact. Up with SBA and walker, steady on feet. Bowel movement at hs. No further episodes of chest pain, patient states \"I believe that was from the roxicodone.\" patient lets needs be known.         "

## 2017-02-04 NOTE — PLAN OF CARE
Problem: Goal Outcome Summary  Goal: Goal Outcome Summary  Outcome: Completed Date Met:  02/04/17  PT:  Completed this episode of care and appropriate to d/c from acute care setting.  Patient progressing as anticipated.  Patient has demonstrated safety, modified independence with al transfers and mobility at time of discharge.  Patient performed stairs and reports good pain management 4 of 10. Patient states this is acceptable Patient verbalizes she has all medical equipment at home and will continue with his HEP and ice as prescribed during this hospital stay.  Best long term outcomes with strength, gait, ROM and pain management.  Home with spouse who verbalizes and patient agrees that she is able to give optimal support for patient recovery at home.  AROM 3-90 degrees    Comments:   Recommendation:  Patient to be d/c home for further rehabilitation in an outpatient setting per patient request and her ability to demonstrate this meets her needs and is appropriate at discharge.  Patient has spouse at home and will assist with any needs prn.

## 2017-02-04 NOTE — PROGRESS NOTES
"[facility] Practice Progress Note           Assessment and Plan:   S/p Procedure(s):  ARTHROPLASTY KNEE  - POD #2  - pain control, wound cares, physical therapy, occupational therapy and DVT prophylaxis per orthopedic surgery service    Chest Pressure, paresthesias morning of 2/3  Reportedly awoke and tingling sensation was self limited.  Nonetheless, reported to RN that she \"just didn't feel right.\"  30-minutes later she developed chest pressure which radiated to her jaw.  Please see progress note for further details.  Patient is pressure free as of 11am 2/3, but would like to stay in house for an additional day of monitoring.  Reports a brother with a massive MI at age 42.     - telemetry today  - troponin x3  - recommend outpatient follow up with PCP  2/4/2017 - Has not had any repeat episodes. Follow up outpatient    Hypertension goal BP (blood pressure) < 140/90  - continue PTA lisinopril  2/4/2017- Stable    GERD (gastroesophageal reflux disease)  - continue PTA omeprazole    DVT Prophylaxis: as per orthopedic surgery service - Defer to primary service  Code Status: Full Code    Discharge home today .              Interval History:   About the same today.  Vitals signs stable.  Tolerating medications and treatment plan without significant side effects or problems.  Eating and voiding well.  No new concerns today.              Significant Problems:   Principal Problem:    Status post total right knee replacement  Active Problems:    Hypertension goal BP (blood pressure) < 140/90    GERD (gastroesophageal reflux disease)             Review of Systems:   The Review of Systems is negative other than noted in the HPI            Medications:       omeprazole  20 mg Oral QAM AC     sodium chloride (PF)  3 mL Intracatheter Q8H     enoxaparin  40 mg Subcutaneous Q24H     senna-docusate  1-2 tablet Oral BID     ranitidine  150 mg Oral BID     gabapentin  100 mg Oral TID     lisinopril  20 mg Oral Daily "             Physical Exam:   Vitals were reviewed  Temp: 98.1  F (36.7  C) Temp src: Oral BP: 156/78 mmHg Pulse: 77 Heart Rate: 74 Resp: 16 SpO2: 97 % O2 Device: None (Room air)    Blood pressure range: Systolic (24hrs), Av mmHg, Min:110 mmHg, Max:166 mmHg  Blood pressure range: Diastolic (24hrs), Av mmHg, Min:51 mmHg, Max:79 mmHg    Intake/Output Summary (Last 24 hours) at 17 1224  Last data filed at 17 1200   Gross per 24 hour   Intake    660 ml   Output      0 ml   Net    660 ml     Constitutional:   awake, alert, cooperative, no apparent distress, and appears stated age     Back:   symmetric     Lungs:   No increased work of breathing, good air exchange, clear to auscultation bilaterally, no crackles or wheezing     Cardiovascular:   Normal apical impulse, regular rate and rhythm, normal S1 and S2, no S3 or S4, and no murmur noted     Abdomen:   No scars, normal bowel sounds, soft, non-distended, non-tender, no masses palpated, no hepatosplenomegally     Musculoskeletal:   RIGHT KNEE:  redness absent  warmth absent  swelling absent  tenderness absent  incision site clean, dry and intact             Data:     Lab Results   Component Value Date    HGB 12.6 2017    HGB 11.9 2017    HGB 14.7 2017          Attestation:  I have reviewed today's vital signs, notes, medications, labs and imaging.  Amount of time performed on this daily note: 20 minutes.    Gabriel Fajardo MD

## 2017-02-04 NOTE — PLAN OF CARE
Problem: Discharge Planning  Goal: Discharge Planning (Adult, OB, Behavioral, Peds)  Outcome: Adequate for Discharge Date Met:  02/04/17  [facility] DISCHARGE NOTE    Patient discharged to home at 1:15 PM via wheel chair. Accompanied by spouse and staff. Discharge instructions reviewed with patient, opportunity offered to ask questions. Prescriptions sent to patients preferred pharmacy. All belongings sent with patient.    Bailee Fedder    Problem: Goal Outcome Summary  Goal: Goal Outcome Summary  Outcome: Adequate for Discharge Date Met:  02/04/17  Up in room with SBA and walker, pain managed using tylenol and ice to incision.  Dressing changed after showering, no drainage.  Appetite good, no problems with bowel or bladder.

## 2017-02-04 NOTE — DISCHARGE SUMMARY
[facility] Orthopedics Discharge Summary                                  [facility]     DEISY JONES [id number]   Age: 66 year old  PCP: Campbell Jacobs, [phone] 1950     Date of Admission:  2/1/2017  Date of Discharge::  2/4/2017  Discharge Physician:  Dian Forman    Code status:  Full Code    Admission Information:  Admission Diagnosis:  degenerative joint disease  Post-operative state    Post-Operative Day: 3 Days Post-Op     Reason for admission:  The patient was admitted for the following:Procedure(s) (LRB):  ARTHROPLASTY KNEE (Right)    Principal Problem:    Status post total right knee replacement  Active Problems:    Hypertension goal BP (blood pressure) < 140/90    GERD (gastroesophageal reflux disease)      Allergies:  Nka    Following the procedure noted above the patient was transferred to the post-op floor and started on:    Therapy:  physical therapy and occupational therapy  Anticoagulation Plan: Lovenox inpatient and then  mg daily at discharge  for 42 days  Pain Management: tylenol  Weight bearing status: Weight bearing as tolerated     The patient was followed and co-managed by the hospitalist service during the inpatient treatment course  Complications:  None  Consultations:  hospitalist for chest pain. Workup negative.      Pertinent Labs   Lab Results: personally reviewed.     Recent Labs   Lab Test  02/04/17   0655  02/03/17   0632  02/02/17   0621  02/01/17   1350  04/12/16   0759  01/13/14   0839   INR   --    --    --   0.98   --    --    HGB   --   12.6  11.9  14.7   --   14.2   HCT   --    --    --   43.6   --   41.2   MCV   --    --    --   77*   --   78   PLT  245   --    --   313   --   258   NA   --    --    --    --   140  140          Discharge Information:  Condition at discharge: Stable  Discharge destination:  Discharged to home     Medications at discharge:  Current Discharge Medication List      START taking these medications    Details    acetaminophen (TYLENOL) 325 MG tablet Take 3 tablets (975 mg) by mouth every 8 hours  Qty: 100 tablet, Refills: 1    Associated Diagnoses: Status post total right knee replacement      aspirin  MG EC tablet Take 1 tablet (325 mg) by mouth daily  Qty: 42 tablet, Refills: 0    Associated Diagnoses: Status post total right knee replacement         CONTINUE these medications which have NOT CHANGED    Details   lisinopril (PRINIVIL/ZESTRIL) 20 MG tablet Take 1 tablet (20 mg) by mouth daily  Qty: 90 tablet, Refills: 3    Comments: Coming now  Associated Diagnoses: Hypertension goal BP (blood pressure) < 140/90      albuterol (ALBUTEROL) 108 (90 BASE) MCG/ACT inhaler Inhale 2 puffs into the lungs every 4 hours as needed for shortness of breath / dyspnea  Qty: 1 Inhaler, Refills: 0      omeprazole (PRILOSEC) 20 MG capsule Take 1 capsule (20 mg) by mouth daily  Qty: 90 capsule, Refills: 3    Associated Diagnoses: Gastritis      pimecrolimus (ELIDEL) 1 % cream Apply topically 2 times daily  Qty: 60 g, Refills: 0    Associated Diagnoses: Dermatitis                        Follow-Up Care:  Patient should be seen in the office in 14 days by the Orthopedic Surgeon/Physician Assistant.  Call [phone] for appointment or questions.    Dian Forman

## 2017-02-04 NOTE — PROGRESS NOTES
"   02/04/17 0900   Signing Clinician's Name / Credentials   Signing clinician's name / credentials Pearl Ford PT, DPT, OZZY, STAR/T   Quick Adds   Rehab Discipline PT   Gait Training   Minutes of Treatment (Gait Training) 23   Symptoms Noted During/After Treatment (Gait Training) fatigue;increased pain   Treatment Detail Patient is in chair and treansferred sit to stand with set up only.  Patient in bed later when therpaist returned to get up for gait and stairs and required set up.  Performed stairs with set up and cues quad cane which patient has at home and emonstrated good technique with 10 steps up with good and down with bad.   Todd Level (Gait Training) stand-by assist   Physical Assistance Level (Gait Training) set-up required;supervision   Weight Bearing (Gait Training) weight-bearing as tolerated   Assistive Device (Gait Training) rolling walker   Gait Distance 125 ft x 2   Pattern Analysis (Gait Training) swing-through gait   Gait Analysis Deviations increased time in double stance;decreased toe-to-floor clearance;decreased weight-shifting ability   Impairments (Gait Analysis/Training) pain;ROM decreased;strength decreased   Therapeutic Exercise   Minutes of Treatment 15   Symptoms Noted During/After Treatment fatigue;increased pain   Treatment Detail Patient struggles more with SAQ and SLR.  Requires AAROM.  She was able to perform satisfactory quad set and SLR by her 10 rep independently.  Patient has written instructions, verbalized, performed and felt independent with Quad sets, heel slides, glut sets, SAQ and LAQ, SLR FF x 20 reps.  Patient verbalizes the importance to continue these at home upon discharge and work on his ROM to maximize her TKA outcomes.   Additional Documentation   Rehab Comments D/C goals met  F/U wioth out patient as planned   PT Plan d/c   [facility] AM-PAC  \"6 Clicks\" V.2 Basic Mobility Inpatient Short Form   1. Turning from your back to your side while in a " flat bed without using bedrails? 4 - None   2. Moving from lying on your back to sitting on the side of a flat bed without using bedrails? 4 - None   3. Moving to and from a bed to a chair (including a wheelchair)? 3 - A Little   4. Standing up from a chair using your arms (e.g., wheelchair, or bedside chair)? 4 - None   5. To walk in hospital room? 3 - A Little   6. Climbing 3-5 steps with a railing? 3 - A Little   Basic Mobility Raw Score (Score out of 24.Lower scores equate to lower levels of function) 21   Total Session Time   Total Session Time (minutes) 38 minutes

## 2017-02-04 NOTE — PROGRESS NOTES
"[facility] Orthopaedics Progress Note      Post-operative Day: 3 Days Post-Op    Procedure(s):  Right Total Knee Arthroplasty - Wound Class: I-Clean      Subjective:    Pain: minimal  Chest pain, SOB:  No. Chest pain from yesterday has not recurred. Patient feeling overall very well today.      Objective:  Blood pressure 156/78, pulse 77, temperature 98.1  F (36.7  C), temperature source Oral, resp. rate 16, height 1.575 m (5' 2\"), weight 92.5 kg (203 lb 14.8 oz), SpO2 97 %, not currently breastfeeding.    Patient Vitals for the past 24 hrs:   BP Temp Temp src Pulse Heart Rate Resp SpO2   02/04/17 0739 156/78 mmHg 98.1  F (36.7  C) Oral 77 - 16 97 %   02/04/17 0016 136/79 mmHg 98.5  F (36.9  C) Oral 77 - 16 98 %   02/03/17 1951 166/73 mmHg 98.2  F (36.8  C) Oral 79 - 16 97 %   02/03/17 1523 130/66 mmHg 98.1  F (36.7  C) Oral - 70 16 96 %   02/03/17 1520 - - - - 68 - -   02/03/17 1254 110/51 mmHg 98.1  F (36.7  C) Oral 78 - 16 98 %   02/03/17 1144 - - - - 75 - -       Wt Readings from Last 4 Encounters:   02/01/17 92.5 kg (203 lb 14.8 oz)   01/24/17 88.451 kg (195 lb)   12/20/16 89.359 kg (197 lb)   08/19/16 91.173 kg (201 lb)         Motor function, sensation, and circulation intact   Yes  Wound status: incisions are clean dry and intact. Yes  Calf tenderness: Bilateral  No    Pertinent Labs   Lab Results: personally reviewed.     Recent Labs   Lab Test  02/04/17   0655  02/03/17   0632  02/02/17   0621  02/01/17   1350  04/12/16   0759  01/13/14   0839   INR   --    --    --   0.98   --    --    HGB   --   12.6  11.9  14.7   --   14.2   HCT   --    --    --   43.6   --   41.2   MCV   --    --    --   77*   --   78   PLT  245   --    --   313   --   258   NA   --    --    --    --   140  140       Plan: Anticoagulation protocol: Lovenox inpatient and then  mg daily at discharge  x 42  days            Pain medications:  tylenol            Weight bearing status:  WBAT            Disposition:  Home today.       "       Continue cares and rehabilitation     Report completed by:  Dian Forman PA-C  Date: 2/4/2017  Time: 9:49 AM

## 2017-02-04 NOTE — PLAN OF CARE
Problem: Goal Outcome Summary  Goal: Goal Outcome Summary  Outcome: No Change  No further chest pain today. SR on tele. Next trop due at 2000.  Up in room to chair and for BRP's with SBA and walker.  Appetite good at lunch and supper.  Voiding without problems, drank prune juice this evening followed by 2 large soft stools.   Dressing changed to right knee, no drainage.

## 2017-02-06 ENCOUNTER — TELEPHONE (OUTPATIENT)
Dept: FAMILY MEDICINE | Facility: CLINIC | Age: 67
End: 2017-02-06

## 2017-02-06 ENCOUNTER — CARE COORDINATION (OUTPATIENT)
Dept: CASE MANAGEMENT | Facility: CLINIC | Age: 67
End: 2017-02-06

## 2017-02-06 NOTE — Clinical Note
[facility] - CARE MANAGEMENT DEPT  [address] [address]  Phone: [phone]    02/06/2017    Lily Linares  [address] [address]        Dear Lily,  I am the Clinic Care Coordinator that works with your primary care provider's clinic. I wanted to thank you for spending the time to talk with me.  Below is a description of what Clinic Care Coordination is and how I can further assist you.     The Clinic Care Coordinator role is a Registered Nurse and/or  who understands the health care system. The goal of Clinic Care Coordination is to help you manage your health and improve access to the [facility] system in the most efficient manner.  The Registered Nurse can assist you in meeting your health care goals by providing education, coordinating services, and strengthening the communication among your providers. The  can assist you with financial, behavioral, psychosocial, and chemical dependency and counseling/psychiatric resources.    Please feel free to keep this letter and contact information to contact me at [phone] with any further questions or concerns that may arise. We at [facility] are focused on providing you with the highest-quality healthcare experience possible and that all starts with you.       Sincerely,     MOISES Tobias, RN, PHN  FPA Care Coordinator    Enclosed: I have enclosed a copy of a 24 Hour Access Plan. This has helpful phone numbers for you to call when needed. Please keep this in an easy to access place to use as needed. Health care directive information also included.               Using Your Patient Care Plan: [facility]  When do I use my care plan?    Emergency room visits: The care plan gives the emergency room staff an overview of your health. And it gives instructions from your doctor about your care.    Hospital: If you bring your care plan, it will take  less time to give your health history when you are admitted.    Seeing a specialist:  Specialists can track changes to your medicines or enter a new diagnosis. You can have them fax the changes to your doctor to update your plan.    Regular or chronic care visits: Review your care plan for any errors before regular visits. Add information you feel would be helpful. (For example, all blood draws should be finger pokes if possible or note that your child cannot sit in a room for very long.)    Caregivers: Give the care plan to all caregivers. This might include your in-home health care team and family members.  How do I make changes to my care plan?  You may write on the care plan. Make changes by crossing out or adding information. Bring the revised care plan when you see your doctor, and share it with your Health Care Home team.  To send plan updates to your Health Care Home team, call, fax, mail or drop off the changes. We will update your care plan and send you a new copy. Please tell us if you need more than one copy. It s a good idea to keep a copy in your home, car, wheel-chair bag or wherever you might need one.

## 2017-02-06 NOTE — TELEPHONE ENCOUNTER
ED/UC/IP follow up phone call:    RN please call to follow up.    Number of ED visits in past 12 months = 1

## 2017-02-06 NOTE — Clinical Note
Health Care Home - Access Care Plan    About Me  Patient Name:  Lily Jones    YOB: 1950  Age:                            66 year old   Nydia MRN:         [id number] Telephone Information:     Home Phone [phone]   Mobile [phone]       Address:    [address] [facility] [address] Email address:  [email or web address]      Emergency Contact(s)  Name Relationship Lgl Grd Work Phone Home Phone Mobile Phone   1. LAUREL JONES Spouse  [phone]    2. PREETI GRACE Daughter   [phone]             Health Maintenance: Routine Health maintenance Reviewed: Due/Overdue: Please schedule follow up with Dr. Jacobs to discuss.    Health Maintenance Due   Topic Date Due     HEPATITIS C SCREENING  11/21/1968     DEXA SCAN SCREENING (SYSTEM ASSIGNED)  11/21/2015     PNEUMOCOCCAL (1 of 2 - PCV13) 11/21/2015     MAMMO SCREEN Q2 YR (SYSTEM ASSIGNED)  01/13/2016       My Access Plan  Medical Emergency 911   Questions or concerns during clinic hours Primary Clinic Line, I will call the clinic directly: Primary Clinic: [facility]- [phone]   24 Hour Appointment Line [phone] or  [phone] ([phone])  (toll free)   24 Hour Nurse Line [phone] (toll free)   Questions or concerns outside clinic hours 24 Hour Appointment Line, I will call the after-hours on-call line:   [facility] [phone] or [phone] ([phone]) (toll-free)   Preferred Urgent Care Preferred Urgent Care: [facility], [phone]   Preferred Hospital Preferred Hospital: [place], [place]  [phone]   Preferred Pharmacy [facility] [address] [place] [facility] [place] [place] - [address] [place] [address]     Behavioral Health Crisis Line [facility], [phone] or 911     My Care Team Members  Patient Care Team       Relationship Specialty Notifications Start End    Campbell Jacobs MD PCP - General Family Practice   1/10/14     Phone: [phone] Fax: [phone]         [address] [facility] [address] [address]    Negra Trujillo, RN Case Manager   2/6/17     Comment:  Through Dr. Jacobs's office    Phone: [phone] Fax: [phone]            My Medical and Care Information  Problem List   Patient Active Problem List   Diagnosis     Obesity     Atopic rhinitis     CARDIOVASCULAR SCREENING; LDL GOAL LESS THAN 160     Advanced directives, counseling/discussion     Scar condition and fibrosis of skin     Hypertension goal BP (blood pressure) < 140/90     Status post total right knee replacement     GERD (gastroesophageal reflux disease)      Current Medications and Allergies:  See printed Medication Report

## 2017-02-06 NOTE — Clinical Note
[facility] - CARE MANAGEMENT DEPT  [address] [address]  Phone: [phone]    02/06/2017    Lily Linares  [address] [facility] [address]        Dear Lily,  I am the Clinic Care Coordinator that works with your primary care provider's clinic. I wanted to thank you for spending the time to talk with me.  Below is a description of what Clinic Care Coordination is and how I can further assist you.     The Clinic Care Coordinator role is a Registered Nurse and/or  who understands the health care system. The goal of Clinic Care Coordination is to help you manage your health and improve access to the [facility] system in the most efficient manner.  The Registered Nurse can assist you in meeting your health care goals by providing education, coordinating services, and strengthening the communication among your providers. The  can assist you with financial, behavioral, psychosocial, and chemical dependency and counseling/psychiatric resources.    Please feel free to keep this letter and contact information to contact me at [phone] with any further questions or concerns that may arise. We at [facility] are focused on providing you with the highest-quality healthcare experience possible and that all starts with you.       Sincerely,     MOISES Tobias, RN, PHN  FPA Care Coordinator      Enclosed: I have enclosed a copy of a 24 Hour Access Plan. This has helpful phone numbers for you to call when needed. Please keep this in an easy to access place to use as needed. Health care directive information is also included.                             Using Your Patient Care Plan: [facility]  When do I use my care plan?    Emergency room visits: The care plan gives the emergency room staff an overview of your health. And it gives instructions from your doctor about your care.    Hospital: If you bring your care  plan, it will take less time to give your health history when you are admitted.    Seeing a specialist:  Specialists can track changes to your medicines or enter a new diagnosis. You can have them fax the changes to your doctor to update your plan.    Regular or chronic care visits: Review your care plan for any errors before regular visits. Add information you feel would be helpful. (For example, all blood draws should be finger pokes if possible or note that your child cannot sit in a room for very long.)    Caregivers: Give the care plan to all caregivers. This might include your in-home health care team and family members.  How do I make changes to my care plan?  You may write on the care plan. Make changes by crossing out or adding information. Bring the revised care plan when you see your doctor, and share it with your Health Care Home team.  To send plan updates to your Health Care Home team, call, fax, mail or drop off the changes. We will update your care plan and send you a new copy. Please tell us if you need more than one copy. It s a good idea to keep a copy in your home, car, wheel-chair bag or wherever you might need one.

## 2017-02-06 NOTE — PROGRESS NOTES
Clinic Care Coordination Contact  OUTREACH    Referral Information:  Referral Source: IP/TCU Report  Reason for Contact: s/p right TKA  Care Conference: No  Name and date of birth verified.       Universal Utilization:   ED Visits in last year: 1  Hospital visits in last year: 1  Last PCP appointment: 01/24/17  Missed Appointments: 0  Concerns: No concerns at this time.  She has had only 1 admission and 1 ER visit this past year which were appropriate  Multiple Providers or Specialists: Ortho (Dr. Ibanez/Parveen Dias PA-C)  Upcoming appointment: 02/16/17 Parveen Dias PA-C at [facility]    Clinical Concerns:  Current Medical Concerns: s/p right knee arthroplasty    Current Behavioral Concerns: Denies concerns for anxiety and depression at this time    Education Provided to patient: Patient advised to give update HCD to clinic when complete so that it can be scanned in to her chart   Clinical Pathway Name: None  Clinical Pathway: None    Medication Management:  Self manages medications.  She said she is only taking Tylenol for pain at this time and pain is well managed.       Functional Status:  Mobility Status: Independent w/Device (walker mostly and uses cane on the stairs)  Equipment Currently Used at Home: walker, rolling, shower chair, cane  Transportation:  or [employer]  Independent with dressing and bathing.  She said they have a walk-in shower.  She said most of her needs are met on the main level.           Psychosocial:  Current living arrangement:: I live in a private home with spouse  Financial/Insurance: [facility]   is supportive.     Resources and Interventions:  Current Resources:    TCO: Dr. Ibanez and Parveen Dias PA-C        Advanced Care Plans/Directives on file: No, states they have one but it is 10 years old.   Referrals Placed: [facility]     Goals: None at this time          Barriers: s/p right TKA  Strengths:  is supportive, following up as recommended, able to express  needs/concerns.    Patient/Caregiver understanding: Lily states she is doing well after her surgery.  She said she is having minimal pain and is managing pain with Tylenol three times per day. She is exercising twice per day and icing afterwards.  She has call out to Delicia in [facility] to get outpatient PT set up.  She said she will have a $30 co-pay for therapy visits.  She said she had a bad reaction to Oxycodone in the hospital so she does not have any at home.  We discussed there are other options other than oxycodone if she needs something stronger than Tylenol once therapy starts.  She verbalized understanding.      Frequency of Care Coordination: As needed         Plan:     1) Lily will follow up as scheduled with Parveen Dias PA-C at [facility] on 2/15/2017. She will follow up with Delicia to get outpatient PT scheduled.    2) RN CC mailed 24 Hour Access care plan, care coordination information sheet and advance care planning information to patient.  No ongoing care coordination needs identified at this time.  Patient has good support from her .  I encouraged Lily to call me if she has questions or needs further assistance.      MOISES Tobias, RN, PHN  FPA Care Coordinator  [phone]  February 6, 2017

## 2017-04-17 DIAGNOSIS — K29.70 GASTRITIS: ICD-10-CM

## 2017-04-17 NOTE — TELEPHONE ENCOUNTER
Omeprazole  Last Written Prescription Date: 04/08/16  Last Fill Quantity: 90,  # refills: 3   Last Office Visit with G, P or [facility] prescribing provider: 01/24/17

## 2017-05-24 ENCOUNTER — OFFICE VISIT (OUTPATIENT)
Dept: FAMILY MEDICINE | Facility: CLINIC | Age: 67
End: 2017-05-24
Payer: COMMERCIAL

## 2017-05-24 VITALS
SYSTOLIC BLOOD PRESSURE: 139 MMHG | WEIGHT: 195 LBS | HEART RATE: 77 BPM | DIASTOLIC BLOOD PRESSURE: 71 MMHG | OXYGEN SATURATION: 96 % | TEMPERATURE: 97.8 F | BODY MASS INDEX: 35.67 KG/M2

## 2017-05-24 DIAGNOSIS — S02.5XXA CLOSED FRACTURE OF TOOTH, INITIAL ENCOUNTER: Primary | ICD-10-CM

## 2017-05-24 PROCEDURE — 99213 OFFICE O/P EST LOW 20 MIN: CPT | Performed by: FAMILY MEDICINE

## 2017-05-24 RX ORDER — AMOXICILLIN 500 MG/1
2000 CAPSULE ORAL ONCE
Qty: 4 CAPSULE | Refills: 3 | Status: SHIPPED | OUTPATIENT
Start: 2017-05-24 | End: 2017-05-24

## 2017-05-24 ASSESSMENT — PAIN SCALES - GENERAL: PAINLEVEL: NO PAIN (0)

## 2017-05-24 NOTE — MR AVS SNAPSHOT
After Visit Summary   5/24/2017    Lily Linares    MRN: [id number]           Patient Information     Date Of Birth          1950        Visit Information        Provider Department      5/24/2017 2:20 PM Joao Pulliam MD [facility]        Today's Diagnoses     Dental abscess    -  1    Closed fracture of tooth, initial encounter           Follow-ups after your visit        Who to contact     If you have questions or need follow up information about today's clinic visit or your schedule please contact [facility] directly at [phone].  Normal or non-critical lab and imaging results will be communicated to you by [employer], letter or phone within 4 business days after the clinic has received the results. If you do not hear from us within 7 days, please contact the clinic through [employer] or phone. If you have a critical or abnormal lab result, we will notify you by phone as soon as possible.  Submit refill requests through [employer] or call your pharmacy and they will forward the refill request to us. Please allow 3 business days for your refill to be completed.          Additional Information About Your Visit        MyChart Information     [employer] gives you secure access to your electronic health record. If you see a primary care provider, you can also send messages to your care team and make appointments. If you have questions, please call your primary care clinic.  If you do not have a primary care provider, please call [phone] and they will assist you.        Care EveryWhere ID     This is your Care EveryWhere ID. This could be used by other organizations to access your [place] medical records  [id number]        Your Vitals Were     Pulse Temperature Pulse Oximetry Breastfeeding? BMI (Body Mass Index)       77 97.8  F (36.6  C) (Tympanic) 96% No 35.67 kg/m2        Blood Pressure from Last 3 Encounters:   05/24/17 139/71   02/04/17 156/78    01/24/17 147/81    Weight from Last 3 Encounters:   05/24/17 195 lb (88.5 kg)   02/01/17 203 lb 14.8 oz (92.5 kg)   01/24/17 195 lb (88.5 kg)              Today, you had the following     No orders found for display         Today's Medication Changes          These changes are accurate as of: 5/24/17  3:12 PM.  If you have any questions, ask your nurse or doctor.               Start taking these medicines.        Dose/Directions    amoxicillin 500 MG capsule   Commonly known as:  AMOXIL   Used for:  Closed fracture of tooth, initial encounter   Started by:  Joao Pulliam MD        Dose:  2000 mg   Take 4 capsules (2,000 mg) by mouth once for 1 dose 1 hour before procedure.   Quantity:  4 capsule   Refills:  3            Where to get your medicines      These medications were sent to [facility] [address]  [address] [facility] [address]     Phone:  [phone]     amoxicillin 500 MG capsule                Primary Care Provider Office Phone # Fax #    Campbell Jacobs [phone]       [facility] [address] [facility] [address]        Thank you!     Thank you for choosing [facility]note 2]  for your care. Our goal is always to provide you with excellent care. Hearing back from our patients is one way we can continue to improve our services. Please take a few minutes to complete the written survey that you may receive in the mail after your visit with us. Thank you!             Your Updated Medication List - Protect others around you: Learn how to safely use, store and throw away your medicines at www.disposemymeds.org.          This list is accurate as of: 5/24/17  3:12 PM.  Always use your most recent med list.                   Brand Name Dispense Instructions for use    acetaminophen 325 MG tablet    TYLENOL    100 tablet    Take 3 tablets (975 mg) by mouth every 8 hours        albuterol 108 (90 BASE) MCG/ACT Inhaler    albuterol    1 Inhaler    Inhale 2 puffs into the lungs every 4 hours as needed for shortness of breath / dyspnea       amoxicillin 500 MG capsule    AMOXIL    4 capsule    Take 4 capsules (2,000 mg) by mouth once for 1 dose 1 hour before procedure.       aspirin  MG EC tablet     42 tablet    Take 1 tablet (325 mg) by mouth daily       lisinopril 20 MG tablet    PRINIVIL/ZESTRIL    90 tablet    Take 1 tablet (20 mg) by mouth daily       omeprazole 20 MG CR capsule    priLOSEC    90 capsule    TAKE ONE CAPSULE BY MOUTH EVERY DAY       pimecrolimus 1 % cream    ELIDEL    60 g    Apply topically 2 times daily

## 2017-05-24 NOTE — PROGRESS NOTES
SUBJECTIVE:                                                    Lily Linares is a 66 year old female who presents to clinic today for the following health issues:    She had a recent total knee surgery 3 months ago. She is going to have dental work on a fractured tooth soon. She would like to have prophylactic antibiotics for her total knee.    She is having frequent areas of ecchymosis on the skin on her hands and forearms. I told her her blood vessels are getting more fragile. She has no clotting abnormalities.      Pt had a tooth fixed and is going to have work done on it she is wondering if she should have an antibiotic, also pt has been getting blood  Spots on her skin         Problem list and histories reviewed & adjusted, as indicated.  Additional history: as documented        Reviewed and updated as needed this visit by clinical staff  Tobacco  Allergies  Med Hx  Surg Hx  Fam Hx  Soc Hx      Reviewed and updated as needed this visit by Provider        Medical, surgical, family, social histories, allergies and meds reviewed and updated.    ROS:  General: No change in weight, sleep or appetite.  Normal energy.  No fever or chills  Resp: No coughing, wheezing or shortness of breath  CV: No chest pains or palpitations  GI: No nausea, vomiting,  heartburn, abdominal pain, diarrhea, constipation or change in bowel habits    Exam:  GENERAL APPEARANCE ADULT: Alert, no acute distress  HENT: Tooth #20 one is broken off at the base.  SKIN: She has 2   1 cm areas of ecchymosis on the back of her hand and forearm.        (S02.5XXA) Closed fracture of tooth, initial encounter  Comment:   Plan: amoxicillin (AMOXIL) 500 MG capsule              PLAN:  Orders Placed This Encounter     amoxicillin (AMOXIL) 500 MG capsule   Recheck in clinic as needed.      There are no Patient Instructions on file for this visit.      Joao Pulliam

## 2017-05-24 NOTE — NURSING NOTE
"Chief Complaint   Patient presents with     Dental Injury     pt broke a tooth and needs an antibiotic      Light/dark Spots     blood blisters spots x 6 months if bumps self on something       Initial /71 (BP Location: Right arm, Patient Position: Chair, Cuff Size: Adult Large)  Pulse 77  Temp 97.8  F (36.6  C) (Tympanic)  Wt 195 lb (88.5 kg)  SpO2 96%  Breastfeeding? No  BMI 35.67 kg/m2 Estimated body mass index is 35.67 kg/(m^2) as calculated from the following:    Height as of 2/1/17: 5' 2\" (1.575 m).    Weight as of this encounter: 195 lb (88.5 kg).  Medication Reconciliation: complete   Araceli Butler CMA       "

## 2017-05-25 ASSESSMENT — PATIENT HEALTH QUESTIONNAIRE - PHQ9: SUM OF ALL RESPONSES TO PHQ QUESTIONS 1-9: 0

## 2017-06-07 ENCOUNTER — TELEPHONE (OUTPATIENT)
Dept: FAMILY MEDICINE | Facility: CLINIC | Age: 67
End: 2017-06-07

## 2017-06-07 NOTE — TELEPHONE ENCOUNTER
Please call patient, the pain medication that she thinks she is allergic to while she was hospitalized last spring was oxycodone which is an ingredient of Percocet.

## 2017-07-06 ENCOUNTER — TELEPHONE (OUTPATIENT)
Dept: FAMILY MEDICINE | Facility: CLINIC | Age: 67
End: 2017-07-06

## 2017-07-06 DIAGNOSIS — Z12.11 SPECIAL SCREENING FOR MALIGNANT NEOPLASMS, COLON: Primary | ICD-10-CM

## 2017-07-06 NOTE — TELEPHONE ENCOUNTER
Panel Management Review      Patient has the following on her problem list:     Hypertension   Last three blood pressure readings:  BP Readings from Last 3 Encounters:   05/24/17 139/71   02/04/17 156/78   01/24/17 147/81     Blood pressure: Passed    HTN Guidelines:  Age 18-59 BP range:  Less than 140/90  Age 60-85 with Diabetes:  Less than 140/90  Age 60-85 without Diabetes:  less than 150/90      Composite cancer screening  Chart review shows that this patient is due/due soon for the following Mammogram and colon/fit  Summary:    Patient is due/failing the following:   COLONOSCOPY, FIT and MAMMOGRAM    Action needed:   Patient needs referral/order: mammogram, colon/fit    Type of outreach:    Phone, spoke to patient.  will schedule mammogram. Patient agreed to Fit. Will mail kit to patients address.    Questions for provider review:    None                                                                                                                                    Kallie Martinez MA       Chart routed to Care Team .

## 2017-07-24 ENCOUNTER — TRANSFERRED RECORDS (OUTPATIENT)
Dept: HEALTH INFORMATION MANAGEMENT | Facility: CLINIC | Age: 67
End: 2017-07-24

## 2017-07-24 LAB
ALT SERPL-CCNC: 12 U/L (ref 0–55)
AST SERPL-CCNC: 13 U/L (ref 10–40)
CHOLEST SERPL-MCNC: 205 MG/DL (ref 0–199)
HDLC SERPL-MCNC: 58 MG/DL
LDLC SERPL CALC-MCNC: 117 MG/DL (ref 0–129)
NONHDLC SERPL-MCNC: 147 MG/DL
TRIGL SERPL-MCNC: 151 MG/DL (ref 0–149)

## 2017-07-29 ENCOUNTER — TRANSFERRED RECORDS (OUTPATIENT)
Dept: HEALTH INFORMATION MANAGEMENT | Facility: CLINIC | Age: 67
End: 2017-07-29

## 2017-08-02 ENCOUNTER — TELEPHONE (OUTPATIENT)
Dept: FAMILY MEDICINE | Facility: CLINIC | Age: 67
End: 2017-08-02

## 2017-08-02 NOTE — TELEPHONE ENCOUNTER
Patient states she had a heart attack.  Taken to [facility] via ambulance.  She states her lisinopril dose was dictated incorrectly at [facility] - 2.5mg was written down and that is what she was given.  She refilled her 20mg - not knowing she was taking 2.5mg at hospital.  She was going through paperwork and noticed 2.5mg was given in the hospital.  She did take a 20mg dose last night unaware of dose change.  She is uncertain what she should be taking now?  She states her other medications were adjusted to her 2.5mg lisinopril dose.    Routing to provider.  Elvia DUNBAR RN

## 2017-08-02 NOTE — TELEPHONE ENCOUNTER
Reason for Call:  Other prescription    Detailed comments: Pt had a heart attack 07/24 and was admitted to [facility] and her Lisinopril Rx was change from 20mg - 2.5mg.  Pt is asking that a new Rx be sent to [facility]    Phone Number Patient can be reached at: Home number on file [phone] (home)    Best Time: any    Can we leave a detailed message on this number? YES    Call taken on 8/2/2017 at 4:42 PM by Mallorie Guerrier

## 2017-08-03 RX ORDER — CLOPIDOGREL BISULFATE 75 MG/1
TABLET ORAL DAILY
COMMUNITY
End: 2017-08-04

## 2017-08-03 RX ORDER — LISINOPRIL 2.5 MG/1
2.5 TABLET ORAL DAILY
COMMUNITY
End: 2017-08-04

## 2017-08-03 RX ORDER — ATORVASTATIN CALCIUM 40 MG/1
40 TABLET, FILM COATED ORAL DAILY
COMMUNITY

## 2017-08-03 NOTE — TELEPHONE ENCOUNTER
FYI:   out of office today.  Metoprolol Tartrate 80 mg 1 tab bid was also ordered while in the Hospital.  Does this make a difference on her Lisinopril dose of 20 mg vs 2.5 mg?  Pt has appt tomorrow to f/u.  Pt unable to check B/P @ home.  Pt will stay on meds as ordered by [facility] until discussed with  tomorrow.  KPavelRN

## 2017-08-04 ENCOUNTER — OFFICE VISIT (OUTPATIENT)
Dept: FAMILY MEDICINE | Facility: CLINIC | Age: 67
End: 2017-08-04
Payer: COMMERCIAL

## 2017-08-04 VITALS
BODY MASS INDEX: 34.23 KG/M2 | SYSTOLIC BLOOD PRESSURE: 120 MMHG | HEART RATE: 62 BPM | DIASTOLIC BLOOD PRESSURE: 68 MMHG | HEIGHT: 62 IN | WEIGHT: 186 LBS | RESPIRATION RATE: 16 BRPM | TEMPERATURE: 98.2 F

## 2017-08-04 DIAGNOSIS — I25.84 CORONARY ARTERY DISEASE DUE TO CALCIFIED CORONARY LESION: ICD-10-CM

## 2017-08-04 DIAGNOSIS — I25.10 CORONARY ARTERY DISEASE DUE TO CALCIFIED CORONARY LESION: ICD-10-CM

## 2017-08-04 DIAGNOSIS — Z12.11 SPECIAL SCREENING FOR MALIGNANT NEOPLASMS, COLON: Primary | ICD-10-CM

## 2017-08-04 PROCEDURE — 99213 OFFICE O/P EST LOW 20 MIN: CPT | Performed by: FAMILY MEDICINE

## 2017-08-04 RX ORDER — CLOPIDOGREL BISULFATE 75 MG/1
75 TABLET ORAL DAILY
COMMUNITY
Start: 2017-08-01 | End: 2018-08-01

## 2017-08-04 RX ORDER — LISINOPRIL 2.5 MG/1
2.5 TABLET ORAL DAILY
Qty: 90 TABLET | Refills: 3 | Status: SHIPPED | OUTPATIENT
Start: 2017-08-04

## 2017-08-04 RX ORDER — LISINOPRIL 2.5 MG/1
2.5 TABLET ORAL DAILY
Qty: 90 TABLET | Refills: 3 | Status: CANCELLED | OUTPATIENT
Start: 2017-08-04

## 2017-08-04 RX ORDER — FAMOTIDINE 20 MG/1
20 TABLET, FILM COATED ORAL DAILY PRN
COMMUNITY
Start: 2017-07-31

## 2017-08-04 NOTE — PROGRESS NOTES
SUBJECTIVE:                                                    Lily Linares is a 66 year old female who presents to clinic today for the following health issues:    On 7/24 she had a NSTEMI and a stent was applied to the right coronary artery.  5 days later she presented with the feeling like her neck was closing off. It was felt this was the   Brilinta. She was switched to Plavix. She had a negative cardiac echo and a negative stress imaging test.          Hospital Follow-up Visit:    Hospital/Nursing Home/ Rehab Facility: [facility]  Date of Admission: 07/24/2017  Date of Discharge: 07/26/2017  Readmitted on 07/29/2017  Discharged 07/31/2017  For throat swelling and needed to have medication changes  Reason(s) for Admission: Pressure in chest and back, lightheaded and pain in left arm.            Problems taking medications regularly:  None       Medication changes since discharge: None       Problems adhering to non-medication therapy:  None    Summary of hospitalization:  See outside records, reviewed and scanned  Diagnostic Tests/Treatments reviewed.  Follow up needed: Cardiology  Other Healthcare Providers Involved in Patient s Care:         None  Update since discharge: improved.     Post Discharge Medication Reconciliation: discharge medications reconciled, continue medications without change.  Plan of care communicated with patient     Coding guidelines for this visit:  Type of Medical   Decision Making Face-to-Face Visit       within 7 Days of discharge Face-to-Face Visit        within 14 days of discharge   Moderate Complexity [id number]   High Complexity 76412 18547                  Problem list and histories reviewed & adjusted, as indicated.  Additional history: as documented        Reviewed and updated as needed this visit by clinical staff  Tobacco  Allergies       Reviewed and updated as needed this visit by Provider        Medical, surgical, family, social histories, allergies and meds reviewed  and updated.    ROS:  General: No change in weight, sleep or appetite.  Normal energy.  No fever or chills  Resp: No coughing, wheezing or shortness of breath  CV: No chest pains or palpitations  GI: No nausea, vomiting,  heartburn, abdominal pain, diarrhea, constipation or change in bowel habits    Exam:  GENERAL APPEARANCE ADULT: Alert, no acute distress  NECK: No adenopathy,masses or thyromegaly  RESP: lungs clear to auscultation   CV: normal rate, regular rhythm, no murmur or gallop    ASSESSMENT:  (Z12.11) Special screening for malignant neoplasms, colon  (primary encounter diagnosis)  Comment:   Plan: Fecal colorectal cancer screen (FIT)            (I25.10,  I25.84) Coronary artery disease due to calcified coronary lesion  Comment:   Plan: lisinopril (PRINIVIL/ZESTRIL) 2.5 MG tablet,         order for DME, CARDIOLOGY EVAL ADULT REFERRAL              PLAN:  Orders Placed This Encounter     Fecal colorectal cancer screen (FIT)     CARDIOLOGY EVAL ADULT REFERRAL     clopidogrel (PLAVIX) 75 MG tablet     famotidine (PEPCID) 20 MG tablet     lisinopril (PRINIVIL/ZESTRIL) 2.5 MG tablet     order for DME   Recheck in clinic as needed.      There are no Patient Instructions on file for this visit.      Joao Pulliam

## 2017-08-04 NOTE — MR AVS SNAPSHOT
After Visit Summary   8/4/2017    Lily Linares    MRN: [id number]           Patient Information     Date Of Birth          1950        Visit Information        Provider Department      8/4/2017 10:20 AM Joao Pulliam MD [facility]        Today's Diagnoses     Special screening for malignant neoplasms, colon    -  1    Coronary artery disease due to calcified coronary lesion           Follow-ups after your visit        Additional Services     CARDIOLOGY EVAL ADULT REFERRAL       Your provider has referred you to:  [facility]: [facility] [phone]   [email or web address]    Please be aware that coverage of these services is subject to the terms and limitations of your health insurance plan.  Call member services at your health plan with any benefit or coverage questions.      Type of Referral:  Cardiology Follow Up    Timeframe requested:  Within 1 month    Please bring the following to your appointment:  >>   Any x-rays, CTs or MRIs which have been performed.  Contact the facility where they were done to arrange for  prior to your scheduled appointment.    >>   List of current medications  >>   This referral request   >>   Any documents/labs given to you for this referral  F/U NSTEMI with RCA stent                  Your next 10 appointments already scheduled     Aug 09, 2017 11:30 AM CDT   Cardiac Evaluation with [place] [facility] EVALUATIONS   [facility] ([facility])    [address] [facility] [address]   [phone]              Future tests that were ordered for you today     Open Future Orders        Priority Expected Expires Ordered    Fecal colorectal cancer screen (FIT) Routine 8/25/2017 10/27/2017 8/4/2017            Who to contact     If you have questions or need follow up information about today's clinic visit or your schedule please  "contact [facility] directly at [phone].  Normal or non-critical lab and imaging results will be communicated to you by MyChart, letter or phone within 4 business days after the clinic has received the results. If you do not hear from us within 7 days, please contact the clinic through Digital Lumens[employer] or phone. If you have a critical or abnormal lab result, we will notify you by phone as soon as possible.  Submit refill requests through [email or web address] or call your pharmacy and they will forward the refill request to us. Please allow 3 business days for your refill to be completed.          Additional Information About Your Visit        Digital Lumenshart Information     [email or web address] gives you secure access to your electronic health record. If you see a primary care provider, you can also send messages to your care team and make appointments. If you have questions, please call your primary care clinic.  If you do not have a primary care provider, please call [phone] and they will assist you.        Care EveryWhere ID     This is your Care EveryWhere ID. This could be used by other organizations to access your [place] medical records  [id number]        Your Vitals Were     Pulse Temperature Respirations Height BMI (Body Mass Index)       62 98.2  F (36.8  C) (Tympanic) 16 5' 2\" (1.575 m) 34.02 kg/m2        Blood Pressure from Last 3 Encounters:   08/04/17 120/68   05/24/17 139/71   02/04/17 156/78    Weight from Last 3 Encounters:   08/04/17 186 lb (84.4 kg)   05/24/17 195 lb (88.5 kg)   02/01/17 203 lb 14.8 oz (92.5 kg)              We Performed the Following     CARDIOLOGY EVAL ADULT REFERRAL          Today's Medication Changes          These changes are accurate as of: 8/4/17 11:36 AM.  If you have any questions, ask your nurse or doctor.               Start taking these medicines.        Dose/Directions    order for DME   Used for:  Coronary artery disease due to calcified coronary lesion   Started by:  Heraclio, " Joao Leger MD        Battery home blood pressure machine   Quantity:  1 Units   Refills:  0         These medicines have changed or have updated prescriptions.        Dose/Directions    lisinopril 2.5 MG tablet   Commonly known as:  PRINIVIL/Zestril   This may have changed:  Another medication with the same name was removed. Continue taking this medication, and follow the directions you see here.   Used for:  Coronary artery disease due to calcified coronary lesion   Changed by:  Joao Pulliam MD        Dose:  2.5 mg   Take 1 tablet (2.5 mg) by mouth daily   Quantity:  90 tablet   Refills:  3            Where to get your medicines      These medications were sent to [facility] [address]  [address] [place] [address]     Phone:  [phone]     lisinopril 2.5 MG tablet         Some of these will need a paper prescription and others can be bought over the counter.  Ask your nurse if you have questions.     Bring a paper prescription for each of these medications     order for DME                Primary Care Provider Office Phone # Fax #    Joao Pulliam [phone]       [facility] [address] [facility] [address]        Equal Access to Services     CHAYITO LEMUS AH: Hadii aad ku hadasho Soomaali, waaxda luqadaha, qaybta kaalmada adeegyada, waxay idiin hayaan jeffrey khramez yeager. So [facility] [phone].    ATENCIÓN: Si habla español, tiene a sadler disposición servicios gratuitos de asistencia lingüística. Llame al [phone].    We comply with applicable federal civil rights laws and [place] laws. We do not discriminate on the basis of race, color, national origin, age, disability sex, sexual orientation or gender identity.            Thank you!     Thank you for choosing [facility]  for your care. Our goal is always to provide you with excellent care. Hearing back  from our patients is one way we can continue to improve our services. Please take a few minutes to complete the written survey that you may receive in the mail after your visit with us. Thank you!             Your Updated Medication List - Protect others around you: Learn how to safely use, store and throw away your medicines at www.disposemymeds.org.          This list is accurate as of: 8/4/17 11:36 AM.  Always use your most recent med list.                   Brand Name Dispense Instructions for use Diagnosis    aspirin 81 MG tablet      Take 81 mg by mouth At Bedtime        clopidogrel 75 MG tablet    PLAVIX     Take 75 mg by mouth daily        famotidine 20 MG tablet    PEPCID     Take 20 mg by mouth daily as needed        LIPITOR 40 MG tablet   Generic drug:  atorvastatin      Take 40 mg by mouth daily        lisinopril 2.5 MG tablet    PRINIVIL/Zestril    90 tablet    Take 1 tablet (2.5 mg) by mouth daily    Coronary artery disease due to calcified coronary lesion       METOPROLOL TARTRATE PO      Take 80 mg by mouth 2 times daily        NITROGLYCERIN SL      Place 0.4 mg under the tongue        order for DME     1 Units    Battery home blood pressure machine    Coronary artery disease due to calcified coronary lesion       pimecrolimus 1 % cream    ELIDEL    60 g    Apply topically 2 times daily    Dermatitis

## 2017-08-04 NOTE — NURSING NOTE
"Chief Complaint   Patient presents with     Hospital F/U     Medication Reconciliation     She was on Lisinopril 20 mg before going to the hospital but when getting home they gave her 2.5 mg tabs.       Initial /68  Pulse 62  Temp 98.2  F (36.8  C) (Tympanic)  Resp 16  Ht 5' 2\" (1.575 m)  Wt 186 lb (84.4 kg)  BMI 34.02 kg/m2 Estimated body mass index is 34.02 kg/(m^2) as calculated from the following:    Height as of this encounter: 5' 2\" (1.575 m).    Weight as of this encounter: 186 lb (84.4 kg).  Medication Reconciliation: complete    Health Maintenance that is potentially due pending provider review:  Mammogram and Colonoscopy/FIT        Is there anyone who you would like to be able to receive your results? No  If yes have patient fill out RADHA    "

## 2017-08-07 PROBLEM — I25.84 CORONARY ARTERY DISEASE DUE TO CALCIFIED CORONARY LESION: Status: ACTIVE | Noted: 2017-08-07

## 2017-08-07 PROBLEM — I25.10 CORONARY ARTERY DISEASE DUE TO CALCIFIED CORONARY LESION: Status: ACTIVE | Noted: 2017-08-07

## 2017-08-08 PROCEDURE — 82274 ASSAY TEST FOR BLOOD FECAL: CPT | Performed by: FAMILY MEDICINE

## 2017-08-09 ENCOUNTER — HOSPITAL ENCOUNTER (OUTPATIENT)
Dept: CARDIAC REHAB | Facility: CLINIC | Age: 67
End: 2017-08-09
Attending: FAMILY MEDICINE
Payer: COMMERCIAL

## 2017-08-09 DIAGNOSIS — Z12.11 SPECIAL SCREENING FOR MALIGNANT NEOPLASMS, COLON: ICD-10-CM

## 2017-08-09 LAB — HEMOCCULT STL QL IA: POSITIVE

## 2017-08-09 PROCEDURE — 40000575 ZZH STATISTIC OP CARDIAC VISIT #2

## 2017-08-09 PROCEDURE — 93798 PHYS/QHP OP CAR RHAB W/ECG: CPT

## 2017-08-09 PROCEDURE — 93797 PHYS/QHP OP CAR RHAB WO ECG: CPT | Mod: 59

## 2017-08-09 PROCEDURE — 40000116 ZZH STATISTIC OP CR VISIT

## 2017-08-10 ENCOUNTER — TELEPHONE (OUTPATIENT)
Dept: FAMILY MEDICINE | Facility: CLINIC | Age: 67
End: 2017-08-10

## 2017-08-10 DIAGNOSIS — Z53.9 ERRONEOUS ENCOUNTER--DISREGARD: Primary | ICD-10-CM

## 2017-08-10 DIAGNOSIS — R19.5 POSITIVE FIT (FECAL IMMUNOCHEMICAL TEST): Primary | ICD-10-CM

## 2017-08-10 NOTE — TELEPHONE ENCOUNTER
Was seen at [facility] today and had a heart attack last week or 2 weeks ago, and was told to follow up with her doctor in 2 days, and there is nothing available today or tomorrow and Dr ALCON Pulliam her provider is out until Tuesday.  Please call about getting her in or what to do. Did have a positive FIT test I am putting in the order for Colonoscopy and mailing her then directions to schedule, she wants to wait until she finds out what is going on with herself now.  Anali Sumner CMA

## 2017-08-10 NOTE — TELEPHONE ENCOUNTER
"Scheduled ER f/u appt with  tomorrow AM.   is out of office.  Pt feeling nervous b/c of Heart Attack 2 weeks ago.  Was in ER this AM for feeling \"off\"and all checked out fine.  KpavelRN  "

## 2017-08-11 ENCOUNTER — OFFICE VISIT (OUTPATIENT)
Dept: FAMILY MEDICINE | Facility: CLINIC | Age: 67
End: 2017-08-11
Payer: COMMERCIAL

## 2017-08-11 VITALS
WEIGHT: 184 LBS | HEIGHT: 62 IN | RESPIRATION RATE: 18 BRPM | OXYGEN SATURATION: 94 % | BODY MASS INDEX: 33.86 KG/M2 | DIASTOLIC BLOOD PRESSURE: 65 MMHG | HEART RATE: 74 BPM | SYSTOLIC BLOOD PRESSURE: 126 MMHG

## 2017-08-11 DIAGNOSIS — I10 HYPERTENSION GOAL BP (BLOOD PRESSURE) < 140/90: Chronic | ICD-10-CM

## 2017-08-11 DIAGNOSIS — R39.9 SYMPTOMS INVOLVING URINARY SYSTEM: ICD-10-CM

## 2017-08-11 DIAGNOSIS — I25.10 CORONARY ARTERY DISEASE DUE TO CALCIFIED CORONARY LESION: Primary | ICD-10-CM

## 2017-08-11 DIAGNOSIS — I25.84 CORONARY ARTERY DISEASE DUE TO CALCIFIED CORONARY LESION: Primary | ICD-10-CM

## 2017-08-11 LAB
ALBUMIN UR-MCNC: ABNORMAL MG/DL
APPEARANCE UR: CLEAR
BACTERIA #/AREA URNS HPF: ABNORMAL /HPF
BILIRUB UR QL STRIP: NEGATIVE
COLOR UR AUTO: YELLOW
GLUCOSE UR STRIP-MCNC: NEGATIVE MG/DL
HGB UR QL STRIP: ABNORMAL
KETONES UR STRIP-MCNC: NEGATIVE MG/DL
LEUKOCYTE ESTERASE UR QL STRIP: NEGATIVE
NITRATE UR QL: NEGATIVE
NON-SQ EPI CELLS #/AREA URNS LPF: ABNORMAL /LPF
PH UR STRIP: 5 PH (ref 5–7)
RBC #/AREA URNS AUTO: ABNORMAL /HPF (ref 0–2)
SP GR UR STRIP: 1.02 (ref 1–1.03)
URN SPEC COLLECT METH UR: ABNORMAL
UROBILINOGEN UR STRIP-ACNC: 0.2 EU/DL (ref 0.2–1)
WBC #/AREA URNS AUTO: ABNORMAL /HPF (ref 0–2)

## 2017-08-11 PROCEDURE — 87086 URINE CULTURE/COLONY COUNT: CPT | Performed by: FAMILY MEDICINE

## 2017-08-11 PROCEDURE — 99214 OFFICE O/P EST MOD 30 MIN: CPT | Performed by: FAMILY MEDICINE

## 2017-08-11 PROCEDURE — 81001 URINALYSIS AUTO W/SCOPE: CPT | Performed by: FAMILY MEDICINE

## 2017-08-11 NOTE — PROGRESS NOTES
"  SUBJECTIVE:                                                    Lily Linares is a 66 year old female who presents to clinic today for the following health issues:      ED/UC Followup:    Facility:  [facility]  Date of visit: 8/10/17  Reason for visit: fuzzy, shaky, light-headed and throat was closing. Patient felt like a reaction.  Current Status: Heart was fine and blood test was normal. Patient feels better today. She is anxious and just wants to discuss with MD.      Patient had heart attack on 7/24/17 and released on 7/26/17 after a MI - needed to be \"revived\" twice in the ER.  Had 100% occlusion of RCA now s/p stent.  Went back on 7/29/17 because throat was closing, breaking out in a sweat, etc and then released on 7/31 due to med problems.   Went back yesterday am to [facility] - called 911 to go there.  Heart was fine, they did some testing and sent her home.     ER took her off of Brilanta and started Plavix once a day  Stopped omeprazole and started famotidine as needed.    Is having some urinary odor, did have a catheter placed when she was in the hospital the first time.     Is on aspirin/plavix/beta blocker and low dose ACE at this time.       Not sure if she worked herself into an anxiety attack yesterday.  ER ruled out cardiac source yesterday as a cause of symptoms.     /65  Pulse 74  Resp 18  Ht 5' 2\" (1.575 m)  Wt 184 lb (83.5 kg)  SpO2 94%  Breastfeeding? No  BMI 33.65 kg/m2  EXAM: GENERAL APPEARANCE: Alert, no acute distress  RESP: lungs clear to auscultation   CV: normal rate, regular rhythm, no murmur or gallop  ABDOMEN: soft, no organomegaly, masses or tenderness    ASSESSMENT/PLAN:      ICD-10-CM    1. Coronary artery disease due to calcified coronary lesion I25.10     I25.84    2. Symptoms involving urinary system R39.9 UA with Microscopic reflex to Culture   3. Hypertension goal BP (blood pressure) < 140/90 I10        Patient Instructions     DESIRAE Plummer [facility] " Practice or [facility] (Wednesday)  Intake exam is 1 hour  Call for appointment  [phone]        Thank you for choosing [facility].  You may be receiving a survey in the mail from Gisell Mchugh regarding your visit today.  Please take a few minutes to complete and return the survey to let us know how we are doing.      Our Clinic hours are:  Mondays    7:20 am - 7 pm  Tues -  Fri  7:20 am - 5 pm    Clinic Phone: [phone]    The clinic lab opens at 7:30 am Mon - Fri and appointments are required.    [facility]. [phone]  Monday-Thursday 8 am - 7pm  Tues/Wed/Fri 8 am - 5:30 pm

## 2017-08-11 NOTE — PROGRESS NOTES
Call: urine doesn't look particularly infected, but there are some red blood cells.  Will have a urine culture done.  If the culture is negative for bacteria/infection, I would recommend another UA in a few weeks to r/o persistent blood in the urine.  If blood persists, would be necessary to see Urologist.    June Emmanuel M.D.

## 2017-08-11 NOTE — PATIENT INSTRUCTIONS
DESIRAE Plummer [facility] or [facility] (Wednesday)  Intake exam is 1 hour  Call for appointment  [phone]        Thank you for choosing [facility].  You may be receiving a survey in the mail from Gisell Mchugh regarding your visit today.  Please take a few minutes to complete and return the survey to let us know how we are doing.      Our Clinic hours are:  Mondays    7:20 am - 7 pm  Tues -  Fri  7:20 am - 5 pm    Clinic Phone: [phone]    The clinic lab opens at 7:30 am Mon - Fri and appointments are required.    [facility] [place] [place]  Ph. [phone]  Monday-Thursday 8 am - 7pm  Tues/Wed/Fri 8 am - 5:30 pm

## 2017-08-11 NOTE — NURSING NOTE
"Chief Complaint   Patient presents with     ER F/U       Initial /65  Pulse 74  Resp 18  Ht 5' 2\" (1.575 m)  Wt 184 lb (83.5 kg)  SpO2 94%  Breastfeeding? No  BMI 33.65 kg/m2 Estimated body mass index is 33.65 kg/(m^2) as calculated from the following:    Height as of this encounter: 5' 2\" (1.575 m).    Weight as of this encounter: 184 lb (83.5 kg).  Medication Reconciliation: complete    "

## 2017-08-11 NOTE — MR AVS SNAPSHOT
After Visit Summary   8/11/2017    Lily Linares    MRN: [id number]           Patient Information     Date Of Birth          1950        Visit Information        Provider Department      8/11/2017 10:00 AM June Emmanuel MD [facility]        Care Instructions      DESIRAE Plummer [facility] or [facility] (Wednesday)  Intake exam is 1 hour  Call for appointment  [phone]        Thank you for choosing [facility].  You may be receiving a survey in the mail from [facility] regarding your visit today.  Please take a few minutes to complete and return the survey to let us know how we are doing.      Our Clinic hours are:  Mondays    7:20 am - 7 pm  Tues -  Fri  7:20 am - 5 pm    Clinic Phone: [phone]    The clinic lab opens at 7:30 am Mon - Fri and appointments are required.    [facility]. [phone]  Monday-Thursday 8 am - 7pm  Tues/Wed/Fri 8 am - 5:30 pm                 Follow-ups after your visit        Your next 10 appointments already scheduled     Aug 14, 2017  9:00 AM CDT   Cardiac Treatment with [facility], [facility] ([facility])    [address] [address]   [phone]            Aug 16, 2017  9:00 AM CDT   Cardiac Treatment with [facility], [facility]   [facility] ([facility])    [address] [address]   [phone]            Aug 18, 2017  9:00 AM CDT   Cardiac Treatment with [facility], [facility] ([facility])    [address] [address]   [phone]            Aug 21, 2017  9:00 AM CDT   Cardiac Treatment with [facility], [facility] ([facility])    [address] [address]   [phone]            Aug 22, 2017 11:30 AM CDT   New Visit with Don  Nahun Hernandez MD   [facility] PHYS[facility]ART AT [facility] ([facility])    [address]  [facility] [address]   [phone]            Aug 23, 2017  9:00 AM CDT   Cardiac Treatment with [facility], [facility]   [facility] ([facility])    [address] [address]   [phone]            Aug 25, 2017  9:00 AM CDT   Cardiac Treatment with [facility], [facility] ([facility])    [address] [address]   [phone]            Aug 28, 2017  9:00 AM CDT   Cardiac Treatment with [facility], [facility] ([facility])    [address] [address]   [phone]            Aug 30, 2017  9:00 AM CDT   Cardiac Treatment with [facility], [facility] ([facility])    [address] [address]   [phone]            Sep 01, 2017  9:00 AM CDT   Cardiac Treatment with [facility], [facility] ([facility])    [address] [address]   [phone]              Who to contact     If you have questions or need follow up information about today's clinic visit or your schedule please contact [facility] directly at [phone].  Normal or non-critical lab and imaging results will be communicated to you by MyChart, letter or phone within 4 business days after the clinic has received the results. If you do not hear from us within 7 days, please contact the clinic through MyChart or phone. If you have a critical or abnormal lab result, we will notify you by phone as soon as possible.  Submit refill requests through Simple IT or call your pharmacy and they will forward the refill request to us. Please allow 3 business days for your refill to be completed.           "Additional Information About Your Visit        MyChart Information     [employer] ([employer]) gives you secure access to your electronic health record. If you see a primary care provider, you can also send messages to your care team and make appointments. If you have questions, please call your primary care clinic.  If you do not have a primary care provider, please call [phone] and they will assist you.        Care EveryWhere ID     This is your Care EveryWhere ID. This could be used by other organizations to access your [place] medical records  [id number]        Your Vitals Were     Pulse Respirations Height Pulse Oximetry Breastfeeding? BMI (Body Mass Index)    74 18 5' 2\" (1.575 m) 94% No 33.65 kg/m2       Blood Pressure from Last 3 Encounters:   08/11/17 126/65   08/04/17 120/68   05/24/17 139/71    Weight from Last 3 Encounters:   08/11/17 184 lb (83.5 kg)   08/04/17 186 lb (84.4 kg)   05/24/17 195 lb (88.5 kg)              Today, you had the following     No orders found for display       Primary Care Provider Office Phone # Fax #    Joao Arsen Pulliam [phone] [address] [address]        Equal Access to Services     CHAYITO LEMUS AH: Hadii aad ku hadasho Soomaali, waaxda luqadaha, qaybta kaalmada adeegyada, waxay idiin hayaan adeeg kharatamara la'minn ah. So [facility] [phone].    ATENCIÓN: Si habla español, tiene a sadler disposición servicios gratuitos de asistencia lingüística. Llchandni al [phone].    We comply with applicable federal civil rights laws and [place] laws. We do not discriminate on the basis of race, color, national origin, age, disability sex, sexual orientation or gender identity.            Thank you!     Thank you for choosing [facility]  for your care. Our goal is always to provide you with excellent care. Hearing back from our patients is one way we can continue to improve our services. Please take a few minutes to complete the " written survey that you may receive in the mail after your visit with us. Thank you!             Your Updated Medication List - Protect others around you: Learn how to safely use, store and throw away your medicines at www.disposemymeds.org.          This list is accurate as of: 8/11/17 10:30 AM.  Always use your most recent med list.                   Brand Name Dispense Instructions for use Diagnosis    aspirin 81 MG tablet      Take 81 mg by mouth At Bedtime        clopidogrel 75 MG tablet    PLAVIX     Take 75 mg by mouth daily        famotidine 20 MG tablet    PEPCID     Take 20 mg by mouth daily as needed        LIPITOR 40 MG tablet   Generic drug:  atorvastatin      Take 40 mg by mouth daily        lisinopril 2.5 MG tablet    PRINIVIL/Zestril    90 tablet    Take 1 tablet (2.5 mg) by mouth daily    Coronary artery disease due to calcified coronary lesion       METOPROLOL TARTRATE PO      Take 80 mg by mouth 2 times daily        NITROGLYCERIN SL      Place 0.4 mg under the tongue        order for DME     1 Units    Battery home blood pressure machine    Coronary artery disease due to calcified coronary lesion       pimecrolimus 1 % cream    ELIDEL    60 g    Apply topically 2 times daily    Dermatitis

## 2017-08-13 LAB
BACTERIA SPEC CULT: NORMAL
MICRO REPORT STATUS: NORMAL
SPECIMEN SOURCE: NORMAL

## 2017-08-14 ENCOUNTER — HOSPITAL ENCOUNTER (OUTPATIENT)
Dept: CARDIAC REHAB | Facility: CLINIC | Age: 67
End: 2017-08-14
Attending: FAMILY MEDICINE
Payer: COMMERCIAL

## 2017-08-14 ENCOUNTER — OFFICE VISIT (OUTPATIENT)
Dept: BEHAVIORAL HEALTH | Facility: CLINIC | Age: 67
End: 2017-08-14
Payer: COMMERCIAL

## 2017-08-14 DIAGNOSIS — F43.23 ADJUSTMENT DISORDER WITH MIXED ANXIETY AND DEPRESSED MOOD: Primary | ICD-10-CM

## 2017-08-14 PROCEDURE — 93798 PHYS/QHP OP CAR RHAB W/ECG: CPT

## 2017-08-14 PROCEDURE — 90791 PSYCH DIAGNOSTIC EVALUATION: CPT | Performed by: SOCIAL WORKER

## 2017-08-14 PROCEDURE — 40000116 ZZH STATISTIC OP CR VISIT

## 2017-08-14 PROCEDURE — 93797 PHYS/QHP OP CAR RHAB WO ECG: CPT | Mod: 59

## 2017-08-14 PROCEDURE — 40000575 ZZH STATISTIC OP CARDIAC VISIT #2

## 2017-08-14 ASSESSMENT — ANXIETY QUESTIONNAIRES
5. BEING SO RESTLESS THAT IT IS HARD TO SIT STILL: NOT AT ALL
3. WORRYING TOO MUCH ABOUT DIFFERENT THINGS: NOT AT ALL
IF YOU CHECKED OFF ANY PROBLEMS ON THIS QUESTIONNAIRE, HOW DIFFICULT HAVE THESE PROBLEMS MADE IT FOR YOU TO DO YOUR WORK, TAKE CARE OF THINGS AT HOME, OR GET ALONG WITH OTHER PEOPLE: NOT DIFFICULT AT ALL
6. BECOMING EASILY ANNOYED OR IRRITABLE: NOT AT ALL
4. TROUBLE RELAXING: NOT AT ALL
7. FEELING AFRAID AS IF SOMETHING AWFUL MIGHT HAPPEN: SEVERAL DAYS
GAD7 TOTAL SCORE: 3
2. NOT BEING ABLE TO STOP OR CONTROL WORRYING: SEVERAL DAYS
1. FEELING NERVOUS, ANXIOUS, OR ON EDGE: SEVERAL DAYS

## 2017-08-14 ASSESSMENT — PATIENT HEALTH QUESTIONNAIRE - PHQ9: SUM OF ALL RESPONSES TO PHQ QUESTIONS 1-9: 2

## 2017-08-14 NOTE — PROGRESS NOTES
Urine culture shows a small amount of multiple bacteria, likely just contamination and not true infection.  Repeat UA in 2-3 weeks to look for red blood cells.    June Emmanuel M.D.

## 2017-08-14 NOTE — MR AVS SNAPSHOT
After Visit Summary   8/14/2017    Lily Linares    MRN: [id number]           Patient Information     Date Of Birth          1950        Visit Information        Provider Department      8/14/2017 2:00 PM Malina Amanda LICSW [facility]        Today's Diagnoses     Adjustment disorder with mixed anxiety and depressed mood    -  1       Follow-ups after your visit        Your next 10 appointments already scheduled     Aug 22, 2017 11:30 AM CDT   New Visit with Don Hernandez MD   [facility] PHYSICIAN HEART AT [facility] ([facility])    [address] [place] [address]   [phone]            Aug 23, 2017  9:00 AM CDT   Cardiac Treatment with [facility], [facility] ([facility])    [address] [address]   [phone]            Aug 25, 2017  9:00 AM CDT   Cardiac Treatment with [facility], [facility] ([facility])    [address] [facility] [address]   [phone]            Aug 28, 2017  9:00 AM CDT   Cardiac Treatment with [facility], [facility] ([facility])    [address] [place] [address]   [phone]            Aug 30, 2017  9:00 AM CDT   Cardiac Treatment with [facility], TH   [facility] ([facility])    [address] [facility] [address]   [phone]            Sep 01, 2017  9:00 AM CDT   Cardiac Treatment with [facility], [facility] ([facility])    [address] [place] [address]   [phone]            Sep 06, 2017  9:00 AM CDT   Cardiac Treatment with [facility], TH   [facility] ([facility])    [address] [place] [address]   [phone]            Sep 08,  2017  9:00 AM CDT   Cardiac Treatment with [facility], TH   [facility] ([facility])    [address] [address]   [phone]            Sep 11, 2017  9:00 AM CDT   Cardiac Treatment with [facility], TH   [facility] ([facility])    [address] [address]   [phone]            Sep 13, 2017  9:00 AM CDT   Cardiac Treatment with [facility], TH   [facility] ([facility])    [address] [address]   [phone]              Who to contact     If you have questions or need follow up information about today's clinic visit or your schedule please contact [facility] directly at [phone].  Normal or non-critical lab and imaging results will be communicated to you by MyChart, letter or phone within 4 business days after the clinic has received the results. If you do not hear from us within 7 days, please contact the clinic through [email or web address] or phone. If you have a critical or abnormal lab result, we will notify you by phone as soon as possible.  Submit refill requests through [employer] or call your pharmacy and they will forward the refill request to us. Please allow 3 business days for your refill to be completed.          Additional Information About Your Visit        [employer] Information     [employer] gives you secure access to your electronic health record. If you see a primary care provider, you can also send messages to your care team and make appointments. If you have questions, please call your primary care clinic.  If you do not have a primary care provider, please call [phone] and they will assist you.        Care EveryWhere ID     This is your Care EveryWhere ID. This could be used by other organizations to access your [place] medical records  [id number]         Blood Pressure from Last 3 Encounters:    08/11/17 126/65   08/04/17 120/68   05/24/17 139/71    Weight from Last 3 Encounters:   08/11/17 184 lb (83.5 kg)   08/04/17 186 lb (84.4 kg)   05/24/17 195 lb (88.5 kg)              Today, you had the following     No orders found for display       Primary Care Provider Office Phone # Fax #    Joao Arsen Pulliam [phone] [address] [facility] [address]        Equal Access to Services     [facility]: Hadii aad ku hadasho Soomaali, waaxda luqadaha, qaybta kaalmada adeegyada, waxamparo anne . So [facility] [phone].    ATENCIÓN: Si habla español, tiene a sadler disposición servicios gratuitos de asistencia lingüística. Ll[facility] [phone].    We comply with applicable federal civil rights laws and [place] laws. We do not discriminate on the basis of race, color, national origin, age, disability sex, sexual orientation or gender identity.            Thank you!     Thank you for choosing [facility]  for your care. Our goal is always to provide you with excellent care. Hearing back from our patients is one way we can continue to improve our services. Please take a few minutes to complete the written survey that you may receive in the mail after your visit with us. Thank you!             Your Updated Medication List - Protect others around you: Learn how to safely use, store and throw away your medicines at www.disposemymeds.org.          This list is accurate as of: 8/14/17 11:59 PM.  Always use your most recent med list.                   Brand Name Dispense Instructions for use Diagnosis    aspirin 81 MG tablet      Take 81 mg by mouth At Bedtime        clopidogrel 75 MG tablet    PLAVIX     Take 75 mg by mouth daily        famotidine 20 MG tablet    PEPCID     Take 20 mg by mouth daily as needed        LIPITOR 40 MG tablet   Generic drug:  atorvastatin      Take 40 mg by mouth daily        lisinopril 2.5 MG tablet    PRINIVIL/Zestril     90 tablet    Take 1 tablet (2.5 mg) by mouth daily    Coronary artery disease due to calcified coronary lesion       METOPROLOL TARTRATE PO      Take 80 mg by mouth 2 times daily        NITROGLYCERIN SL      Place 0.4 mg under the tongue        order for DME     1 Units    Battery home blood pressure machine    Coronary artery disease due to calcified coronary lesion       pimecrolimus 1 % cream    ELIDEL    60 g    Apply topically 2 times daily    Dermatitis

## 2017-08-15 ASSESSMENT — ANXIETY QUESTIONNAIRES: GAD7 TOTAL SCORE: 3

## 2017-08-16 ENCOUNTER — HOSPITAL ENCOUNTER (OUTPATIENT)
Dept: CARDIAC REHAB | Facility: CLINIC | Age: 67
End: 2017-08-16
Attending: FAMILY MEDICINE
Payer: COMMERCIAL

## 2017-08-16 PROCEDURE — 40000575 ZZH STATISTIC OP CARDIAC VISIT #2

## 2017-08-16 PROCEDURE — 93797 PHYS/QHP OP CAR RHAB WO ECG: CPT | Mod: 59

## 2017-08-16 PROCEDURE — 40000116 ZZH STATISTIC OP CR VISIT

## 2017-08-16 PROCEDURE — 93798 PHYS/QHP OP CAR RHAB W/ECG: CPT

## 2017-08-17 NOTE — PROGRESS NOTES
"[facility]  Integrated Behavioral Health Services   Diagnostic Assessment      PATIENT'S NAME: Lily Linares  MRN:   [id number]  :   1950  DATE OF SERVICE: 2017  SERVICE LOCATION: Face to Face in Clinic  Visit Activities: NEW and [facility] Only      Identifying Information:  Patient is a 66 year old year old, ,  female.  Patient attended the session alone.        Referral:  Patient was referred for an assessment by PCP at [facility].   Reason for referral: clarify behavioral health diagnosis, determine behavioral health treatment options, assess client readiness and motivation to change, assess client social situation and address the interaction of behavioral and medical issues.       Patient's Statement of Presenting Concern:  Patient reports the following reason(s) for seeking an assessment at this time: Patient had a heart attack on 2017 she reports that she had to be revived twice. She also reports having difficulty with medications and having to be hospitalized. Patient stated that her symptoms have resulted in the following functional impairments: home life with Spouse, relationship(s), self-care and social interactions      History of Presenting Concern:  Patient reports that these problem(s) began in 2017 with heart attack and having to be re hospitalized. Patient reports she went to the emergency department on 8/10/2017 because she was \"scared\" that she was having another heart attack. She was checked out and it was determined that she was having an anxiety attack .  Patient worries about having another heart attack and wonders if being told she had to be \"revived twice\" in the ambulance increased her fear. Patient has attempted to resolve these concerns in the past through: physician / PCP. Patient reports that other professional(s) are involved in providing support / services.       Social History:  Patient " "reported she grew up in [place], [place]. She is the second born of 2 children.  Patient reported that her childhood was \"fantastic\".  Patient reported a history of 2 committed relationships or marriages. Patient has been  for 26 years. Patient reported having one daughter.  Patient identified some stable and meaningful social connections.     Patient lives with .  Patient is currently retired.  Work history:  /accounting    Patient reported that she has not been involved with the legal system.  Patient's highest education level was GED. Patient did not identify any learning problems. There are no ethnic, cultural or Moravian factors that may be relevant for therapy.  Patient did not serve in the .    Mental Health History:  Patient reported no family history of mental health issues. Patient has not received mental health services in the past. Patient is not currently receiving any mental health services.      Chemical Health History:  Patient reported the following biological family members or relatives with chemical health issues: First - 16 years reportedly used alcohol . Patient has not received chemical dependency treatment in the past. Patient is not currently receiving any chemical dependency treatment. Patient reports no problems as a result of their drinking / drug use.      Cage-AID score is: negative.  Based on Cage-Aid score and clinical interview there  are not indications of drug or alcohol abuse.      Discussed the general effects of drugs and alcohol on health and well-being.      Significant Losses / Trauma / Abuse / Neglect Issues:  There are indications or report of significant loss, trauma, abuse or neglect issues related to: death of Loss of parents and brother and major medical problems Recent heart attack.    Issues of possible neglect are not present.      Medical History:   Patient Active Problem List   Diagnosis     Obesity     Atopic rhinitis "     Advanced directives, counseling/discussion     Scar condition and fibrosis of skin     Hypertension goal BP (blood pressure) < 140/90     Status post total right knee replacement     GERD (gastroesophageal reflux disease)     Coronary artery disease due to calcified coronary lesion       Medication Review:  Current Outpatient Prescriptions   Medication     clopidogrel (PLAVIX) 75 MG tablet     famotidine (PEPCID) 20 MG tablet     lisinopril (PRINIVIL/ZESTRIL) 2.5 MG tablet     order for DME     METOPROLOL TARTRATE PO     aspirin 81 MG tablet     atorvastatin (LIPITOR) 40 MG tablet     NITROGLYCERIN SL     pimecrolimus (ELIDEL) 1 % cream     No current facility-administered medications for this visit.        Patient was provided recommendation to follow-up with physician.    Mental Status Assessment:  Appearance:   Appropriate   Eye Contact:   Good   Psychomotor Behavior: Normal   Attitude:   Cooperative   Orientation:   All  Speech   Rate / Production: Normal    Volume:  Normal   Mood:    Anxious  Normal  Affect:    Appropriate  Worrisome   Thought Content:  Clear   Thought Form:  Coherent  Logical   Insight:    Good       Safety Assessment:    Patient denies a history of suicidal ideation, suicide attempts, self-injurious behavior, homicidal ideation, homicidal behavior and and other safety concerns  Patient denies current or recent suicidal ideation or behaviors.  Patient denies current or recent homicidal ideation or behaviors.  Patient denies current or recent self injurious behavior or ideation.  Patient denies other safety concerns.  Patient reports there are firearms in the house. The firearms are secured in a locked space  Protective Factors Sense of responsibility to family, Life Satisfaction, Reality testing ability, Positive coping skills, Positive problem-solving skills and Positive social support   Risk Factors : Patient reports none      Plan for Safety and Risk Management:  A safety and risk  management plan has not been developed at this time, however patient was encouraged to call [place] [phone] should there be a change in any of these risk factors.      Review of Symptoms:  Depression: Sleep Energy  Angelica:  No symptoms  Psychosis: No symptoms  Anxiety: Worries Nervousness Unable to stop or control worry thoughts and feeling afraid of something awful might happen  Panic:  Palpitations Shortness of Breath  Post Traumatic Stress Disorder: No symptoms  Obsessive Compulsive Disorder: No symptoms  Eating Disorder: No symptoms  Oppositional Defiant Disorder: No symptoms  ADD / ADHD: No symptoms  Conduct Disorder: No symptoms    Patient's Strengths and Limitations:  Patient identified the following strengths or resources that will help her succeed in counseling: commitment to health and well being, friends / good social support, family support, intelligence and sense of humor. Patient identified the following supports: family and friends. Things that may interfere with the patient's success in behavioral health services include:None.    Diagnostic Criteria:  A. The development of emotional or behavioral symptoms in response to an identifiable stressor(s) occurring within 3 months of the onset of the stressor(s)  B. These symptoms or behaviors are clinically significant, as evidenced by one or both of the following:       - Significant impairment in social, occupational, or other important areas of functioning  C. The stress-related disturbance does not meet criteria for another disorder & is not not an exacerbation of another mental disorder  D. The symptoms do not represent normal bereavement  E. Once the stressor or its consequences have terminated, the symptoms do not persist for more than an additional 6 months       * Adjustment Disorder with Mixed Anxiety and Depressed Mood: The predominant manifestation is a combination of depression and anxiety      Functional Status:  Patient's symptoms have  caused and are causing reduced functional status in the following areas: Follow through with Medical recommendations - Extra medical appointments due to worries about health  Social / Relational - May impair ability to follow-through with relationships and activities due to fear.      DSM5 Diagnoses: (Sustained by DSM5 Criteria Listed Above)  Diagnoses: Adjustment Disorders  309.28 (F43.23) With mixed anxiety and depressed mood  Psychosocial & Contextual Factors: Recent heart attack  WHODAS Score: 14  See Media section of EPIC medical record for completed WHODAS    Preliminary Treatment Plan:    The client reports no currently identified Jewish, ethnic or cultural issues relevant to therapy.    Initial Treatment will focus on: Adjustment Difficulties related to: Major health issue.    Chemical dependency recommendations: No indications of CD issues    As a preliminary treatment goal, patient will experience a reduction in anxiety, will develop more effective coping skills to manage anxiety symptoms, will develop healthy cognitive patterns and beliefs and will increase ability to function adaptively and will develop coping/problem-solving skills to facilitate more adaptive adjustment.    The focus of initial interventions will be to alleviate anxiety, facilitate appropriate expression of feelings, increase coping skills, increase trust, process losses, teach relaxation strategies and teach stress mangement techniques.    The patient is receiving treatment / structured support from the following professional(s) / service and treatment. Collaboration will be initiated with: primary care physician.    Referral to another professional/service is not indicated at this time..    A Release of Information is not needed at this time.    Report to child or adult protection services was HADRIK.    Malina Amanda [facility], Behavioral Health Clinician

## 2017-08-18 ENCOUNTER — HOSPITAL ENCOUNTER (OUTPATIENT)
Dept: CARDIAC REHAB | Facility: CLINIC | Age: 67
End: 2017-08-18
Attending: FAMILY MEDICINE
Payer: COMMERCIAL

## 2017-08-18 PROCEDURE — 93798 PHYS/QHP OP CAR RHAB W/ECG: CPT | Performed by: REHABILITATION PRACTITIONER

## 2017-08-18 PROCEDURE — 40000116 ZZH STATISTIC OP CR VISIT: Performed by: REHABILITATION PRACTITIONER

## 2017-08-21 ENCOUNTER — HOSPITAL ENCOUNTER (OUTPATIENT)
Dept: CARDIAC REHAB | Facility: CLINIC | Age: 67
End: 2017-08-21
Attending: FAMILY MEDICINE
Payer: COMMERCIAL

## 2017-08-21 ENCOUNTER — TELEPHONE (OUTPATIENT)
Dept: FAMILY MEDICINE | Facility: CLINIC | Age: 67
End: 2017-08-21

## 2017-08-21 PROCEDURE — 93798 PHYS/QHP OP CAR RHAB W/ECG: CPT

## 2017-08-21 PROCEDURE — 40000116 ZZH STATISTIC OP CR VISIT

## 2017-08-22 ENCOUNTER — OFFICE VISIT (OUTPATIENT)
Dept: CARDIOLOGY | Facility: CLINIC | Age: 67
End: 2017-08-22
Attending: FAMILY MEDICINE
Payer: COMMERCIAL

## 2017-08-22 VITALS
HEART RATE: 64 BPM | BODY MASS INDEX: 34.28 KG/M2 | DIASTOLIC BLOOD PRESSURE: 68 MMHG | OXYGEN SATURATION: 96 % | WEIGHT: 187.4 LBS | SYSTOLIC BLOOD PRESSURE: 129 MMHG

## 2017-08-22 DIAGNOSIS — I25.10 CORONARY ARTERY DISEASE INVOLVING NATIVE CORONARY ARTERY OF NATIVE HEART WITHOUT ANGINA PECTORIS: Primary | ICD-10-CM

## 2017-08-22 PROCEDURE — 99204 OFFICE O/P NEW MOD 45 MIN: CPT | Performed by: INTERNAL MEDICINE

## 2017-08-22 NOTE — PROGRESS NOTES
CARDIOLOGY CONSULT    REASON FOR CONSULT: CAD    PRIMARY CARE PHYSICIAN:  Joao Pulliam    HISTORY OF PRESENT ILLNESS:  66 year old female seen for recently diagnosed coronary artery disease.     July 24, 2017 she presented with inferior STEMI and vifb arrest, symptom of throat tightness.  Coronary angiogram at [facility] showed thrombotic 100% occlusion of proximal RCA, 50% mid LAD with FFR 0.90, single drug-eluting stent of the proximal RCA.  Troponin peak of 20.     Lexiscan nuclear stress July 2017 at [facility] shows moderate infarct in the mid and apical inferior wall and inferoseptal wall, no ischemia, EF 60%, normal wall motion.  Echo July 2017 from [facility] showed EF 55%, normal RV size and function, no significant valve disease.       In the last few weeks she's been doing well.  She started cardiac rehabilitation and has no shortness of breath or chest pain on the cardio machines.  Blood pressure at home runs in the 130s or less with pulse in the 60s.    PAST MEDICAL HISTORY:  Past Medical History:   Diagnosis Date     Cellulitis 2013     FX LOWER HUMERUS NEC-OPEN 10/16/2007     Major depression in complete remission (H)      Obese        MEDICATIONS:  Current Outpatient Prescriptions   Medication     clopidogrel (PLAVIX) 75 MG tablet     famotidine (PEPCID) 20 MG tablet     lisinopril (PRINIVIL/ZESTRIL) 2.5 MG tablet     METOPROLOL TARTRATE PO     aspirin 81 MG tablet     atorvastatin (LIPITOR) 40 MG tablet     NITROGLYCERIN SL     order for DME     No current facility-administered medications for this visit.        ALLERGIES:  Allergies   Allergen Reactions     Brilinta [Ticagrelor]      [place] throat was closing     Oxycodone      She said that during her hospitalization in February 2017 she was treated with oxycodone and shortly after she felt chest pain, paresthesias on the lips, wooziness, feeling awful.       SOCIAL HISTORY:  I have reviewed this patient's social history and  updated it with pertinent information if needed. Lily Linares  reports that she has never smoked. She has never used smokeless tobacco. She reports that she drinks alcohol. She reports that she does not use illicit drugs.    FAMILY HISTORY:  I have reviewed this patient's family history and updated it with pertinent information if needed.   Family History   Problem Relation Age of Onset     DIABETES Brother      Hypertension Brother      CEREBROVASCULAR DISEASE Brother      JUANISA.D. Brother        age 50     Cardiovascular Brother      Thyroid Disease Mother      Cardiovascular Maternal Grandfather      MI at age 83     CANCER Maternal Uncle      prostate       REVIEW OF SYSTEMS:  Constitutional:  No weight loss, fever, chills, weakness or fatigue.  HEENT:  Eyes:  No visual loss, blurred vision, double vision or yellow sclerae. No hearing loss, sneezing, congestion, runny nose or sore throat.  Skin:  No rash or itching.  Cardiovascular: per HPI  Respiratory: per HPI  GI:  No anorexia, nausea, vomiting or diarrhea. No abdominal pain or blood.  :  No dysurea, hematuria  Neurologic:  No headache, dizziness, syncope, paralysis, ataxia, numbness or tingling in the extremities. No change in bowel or bladder control.  Musculoskeletal:  No muscle, back pain, joint pain or stiffness.  Hematologic:  No anemia, bleeding or bruising.  Lymphatics:  No enlarged nodes. No history of splenectomy.  Psychiatric:  No history of depression or anxiety.  Endocrine:  No reports of sweating, cold or heat intolerance. No polyuria or polydipsia.  Allergies:  No history of asthma, hives, eczema or rhinitis.    PHYSICAL EXAM:      BP: 129/68 Pulse: 64     SpO2: 96 %      Vital Signs with Ranges  Pulse:  [64] 64  BP: (129)/(68) 129/68  SpO2:  [96 %] 96 %  187 lbs 6.4 oz    Constitutional: awake, alert, no distress  Eyes: PERRL, sclera nonicteric  ENT: trachea midline  Respiratory: Lungs clear  Cardiovascular: Regular rate and rhythm, no  murmur  GI: nondistended, nontender, bowel sounds present  Lymph/Hematologic: no lymphadenopathy  Skin: dry, no rash  Musculoskeletal: good muscle tone, strength 5/5 in upper and lower extremities  Neurologic: no focal deficits  Neuropsychiatric: appropriate affact    DATA:  Labs:   July 2017: Cholesterol 205, triglycerides 151, HDL 58,     ASSESSMENT:  66 year old female seen for recently diagnosed coronary artery disease.  Clinically she is doing very well and has no concerning symptoms.  Blood pressure is well-controlled.  She is due to have her lipids rechecked.  She is participating in cardiac rehabilitation and she has made some dietary modifications.    She had questions about a colonoscopy, and she has never had one.  She probably should wait until she is off Plavix.  Otherwise she could have a diagnostic colonoscopy, but biopsy or polyp removal would likely be higher risk due to bleeding.    RECOMMENDATIONS:  1.  1.  Coronary artery disease, status post drug-eluting stents to the proximal RCA  - Continue dual antiplatelet therapy through July 2018  - Continue other cardiac medications  - Complete cardiac rehabilitation  - Recheck fasting lipids within the next few weeks, goal LDL less than 70    Follow-up in 3 months.    Don Hernandez MD  Cardiology - [facility]  Pager:  [phone]  Text Page  August 22, 2017

## 2017-08-22 NOTE — LETTER
8/22/2017    Joao Pulliam MD  [address] [place] [address]    RE: Lily Linares       Dear Colleague,    I had the pleasure of seeing Lily Linares in the [facility].    CARDIOLOGY CONSULT    REASON FOR CONSULT: CAD    PRIMARY CARE PHYSICIAN:  Joao Pulliam    HISTORY OF PRESENT ILLNESS:  66 year old female seen for recently diagnosed coronary artery disease.     July 24, 2017 she presented with inferior STEMI and vifb arrest, symptom of throat tightness.  Coronary angiogram at [facility] showed thrombotic 100% occlusion of proximal RCA, 50% mid LAD with FFR 0.90, single drug-eluting stent of the proximal RCA.  Troponin peak of 20.     Lexiscan nuclear stress July 2017 at [facility] shows moderate infarct in the mid and apical inferior wall and inferoseptal wall, no ischemia, EF 60%, normal wall motion.  Echo July 2017 from [facility] showed EF 55%, normal RV size and function, no significant valve disease.       In the last few weeks she's been doing well.  She started cardiac rehabilitation and has no shortness of breath or chest pain on the cardio machines.  Blood pressure at home runs in the 130s or less with pulse in the 60s.    PAST MEDICAL HISTORY:  Past Medical History:   Diagnosis Date     Cellulitis 2013     FX LOWER HUMERUS NEC-OPEN 10/16/2007     Major depression in complete remission (H)      Obese        MEDICATIONS:  Current Outpatient Prescriptions   Medication     clopidogrel (PLAVIX) 75 MG tablet     famotidine (PEPCID) 20 MG tablet     lisinopril (PRINIVIL/ZESTRIL) 2.5 MG tablet     METOPROLOL TARTRATE PO     aspirin 81 MG tablet     atorvastatin (LIPITOR) 40 MG tablet     NITROGLYCERIN      order for DME     No current facility-administered medications for this visit.        ALLERGIES:  Allergies   Allergen Reactions     Brilinta [Ticagrelor]      [place] throat was closing     Oxycodone      She said that during her  hospitalization in 2017 she was treated with oxycodone and shortly after she felt chest pain, paresthesias on the lips, wooziness, feeling awful.       SOCIAL HISTORY:  I have reviewed this patient's social history and updated it with pertinent information if needed. Lily Linares  reports that she has never smoked. She has never used smokeless tobacco. She reports that she drinks alcohol. She reports that she does not use illicit drugs.    FAMILY HISTORY:  I have reviewed this patient's family history and updated it with pertinent information if needed.   Family History   Problem Relation Age of Onset     DIABETES Brother      Hypertension Brother      CEREBROVASCULAR DISEASE Brother      C.A.D. Brother        age 50     Cardiovascular Brother      Thyroid Disease Mother      Cardiovascular Maternal Grandfather      MI at age 83     CANCER Maternal Uncle      prostate       REVIEW OF SYSTEMS:  Constitutional:  No weight loss, fever, chills, weakness or fatigue.  HEENT:  Eyes:  No visual loss, blurred vision, double vision or yellow sclerae. No hearing loss, sneezing, congestion, runny nose or sore throat.  Skin:  No rash or itching.  Cardiovascular: per HPI  Respiratory: per HPI  GI:  No anorexia, nausea, vomiting or diarrhea. No abdominal pain or blood.  :  No dysurea, hematuria  Neurologic:  No headache, dizziness, syncope, paralysis, ataxia, numbness or tingling in the extremities. No change in bowel or bladder control.  Musculoskeletal:  No muscle, back pain, joint pain or stiffness.  Hematologic:  No anemia, bleeding or bruising.  Lymphatics:  No enlarged nodes. No history of splenectomy.  Psychiatric:  No history of depression or anxiety.  Endocrine:  No reports of sweating, cold or heat intolerance. No polyuria or polydipsia.  Allergies:  No history of asthma, hives, eczema or rhinitis.    PHYSICAL EXAM:      BP: 129/68 Pulse: 64     SpO2: 96 %      Vital Signs with Ranges  Pulse:  [64] 64  BP:  (129)/(68) 129/68  SpO2:  [96 %] 96 %  187 lbs 6.4 oz    Constitutional: awake, alert, no distress  Eyes: PERRL, sclera nonicteric  ENT: trachea midline  Respiratory: Lungs clear  Cardiovascular: Regular rate and rhythm, no murmur  GI: nondistended, nontender, bowel sounds present  Lymph/Hematologic: no lymphadenopathy  Skin: dry, no rash  Musculoskeletal: good muscle tone, strength 5/5 in upper and lower extremities  Neurologic: no focal deficits  Neuropsychiatric: appropriate affact    DATA:  Labs:   July 2017: Cholesterol 205, triglycerides 151, HDL 58,     ASSESSMENT:  66 year old female seen for recently diagnosed coronary artery disease.  Clinically she is doing very well and has no concerning symptoms.  Blood pressure is well-controlled.  She is due to have her lipids rechecked.  She is participating in cardiac rehabilitation and she has made some dietary modifications.    She had questions about a colonoscopy, and she has never had one.  She probably should wait until she is off Plavix.  Otherwise she could have a diagnostic colonoscopy, but biopsy or polyp removal would likely be higher risk due to bleeding.    RECOMMENDATIONS:  1.  1.  Coronary artery disease, status post drug-eluting stents to the proximal RCA  - Continue dual antiplatelet therapy through July 2018  - Continue other cardiac medications  - Complete cardiac rehabilitation  - Recheck fasting lipids within the next few weeks, goal LDL less than 70    Follow-up in 3 months.    Don Hernandez MD  Cardiology - [facility]  Pager:  [phone]  Text Page  August 22, 2017    Thank you for allowing me to participate in the care of your patient.    Sincerely,     Don Hernandez MD     [facility]

## 2017-08-22 NOTE — TELEPHONE ENCOUNTER
[facility] outpatient request form completed, signed and faxed to [facility] at [phone]

## 2017-08-22 NOTE — MR AVS SNAPSHOT
After Visit Summary   8/22/2017    Lily Linares    MRN: [id number]           Patient Information     Date Of Birth          1950        Visit Information        Provider Department      8/22/2017 11:30 AM Don Hernandez MD [facility] PHYSICIAN HEART AT [facility]        Today's Diagnoses     Coronary artery disease involving native coronary artery of native heart without angina pectoris    -  1       Follow-ups after your visit        Additional Services     Follow-Up with Cardiac Advanced Practice Provider                 Your next 10 appointments already scheduled     Aug 23, 2017  9:00 AM CDT   [facility]iac [facility] with [facility], [facility] ([facility])    [address] [address]   [phone]            Aug 25, 2017  9:00 AM CDT   [facility], [facility] ([facility])    [address] [address]   [phone]            Aug 28, 2017  9:00 AM CDT   [facility], [facility] ([facility])    [address] [address]   [phone]            Aug 30, 2017  9:00 AM CDT   [facility], [facility] ([facility])    [address] [place] [address]   [phone]            Sep 01, 2017  9:00 AM CDT   [facility], [facility] ([facility])    [address] [address]   [phone]            Sep 06, 2017  9:00 AM CDT   [facility], [facility] ([facility])    [address] [address]   [phone]            Sep 08, 2017  9:00 AM CDT   Cardiac Treatment with [facility], [facility]   [facility] ([facility])    [address] [address]   [phone]            Sep 11, 2017  9:00 AM CDT   Cardiac Treatment with [facility], [facility]   [facility] ([facility])    [address] [address]   [phone]            Sep 13, 2017  9:00 AM CDT   Cardiac Treatment with [facility], [facility]   [facility] ([facility])    [address] [address]   [phone]            Sep 15, 2017  9:00 AM CDT   Cardiac Treatment with [facility], [facility] ([facility])    [address] [address]   [phone]              Future tests that were ordered for you today     Open Future Orders        Priority Expected Expires Ordered    Follow-Up with Cardiac Advanced Practice Provider Routine 11/20/2017 8/22/2018 8/22/2017    Lipid Profile Routine 8/29/2017 8/22/2018 8/22/2017            Who to contact     If you have questions or need follow up information about today's clinic visit or your schedule please contact [facility] PHYSICIAN HEART AT [facility] directly at [phone].  Normal or non-critical lab and imaging results will be communicated to you by [employer], letter or phone within 4 business days after the clinic has received the results. If you do not hear from us within 7 days, please contact the clinic through [employer] or phone. If you have a critical or abnormal lab result, we will notify you by phone as soon as possible.  Submit refill requests through [employer] or call your pharmacy and they will forward the refill request to us. Please allow 3 business days for your refill to be completed.          Additional Information About Your Visit        [employer] Information     [employer] gives you secure access to your electronic health record. If you see a primary  care provider, you can also send messages to your care team and make appointments. If you have questions, please call your primary care clinic.  If you do not have a primary care provider, please call [phone] and they will assist you.        Care EveryWhere ID     This is your Care EveryWhere ID. This could be used by other organizations to access your [place] medical records  [id number]        Your Vitals Were     Pulse Pulse Oximetry BMI (Body Mass Index)             64 96% 34.28 kg/m2          Blood Pressure from Last 3 Encounters:   08/22/17 129/68   08/11/17 126/65   08/04/17 120/68    Weight from Last 3 Encounters:   08/22/17 85 kg (187 lb 6.4 oz)   08/11/17 83.5 kg (184 lb)   08/04/17 84.4 kg (186 lb)               Primary Care Provider Office Phone # Fax #    Joao Arsen Pulliam [phone] [address] [address]        Equal Access to Services     ASHLYN LEMUS AH: Hadii aad ku hadasho Soomaali, waaxda luqadaha, qaybta kaalmada adeegyada, waxay idiin hayaan jeffrey kharash labertin . So [facility] [phone].    ATENCIÓN: Si habla español, tiene a sadler disposición servicios gratuitos de asistencia lingüística. [facility] [phone].    We comply with applicable federal civil rights laws and [place] laws. We do not discriminate on the basis of race, color, national origin, age, disability sex, sexual orientation or gender identity.            Thank you!     Thank you for choosing [facility] PHYSICIAN HEART AT [facility]  for your care. Our goal is always to provide you with excellent care. Hearing back from our patients is one way we can continue to improve our services. Please take a few minutes to complete the written survey that you may receive in the mail after your visit with us. Thank you!             Your Updated Medication List - Protect others around you: Learn how to safely use, store and throw away your medicines at www.disposemymeds.org.           This list is accurate as of: 8/22/17 11:50 AM.  Always use your most recent med list.                   Brand Name Dispense Instructions for use Diagnosis    aspirin 81 MG tablet      Take 81 mg by mouth At Bedtime        clopidogrel 75 MG tablet    PLAVIX     Take 75 mg by mouth daily        famotidine 20 MG tablet    PEPCID     Take 20 mg by mouth daily as needed        LIPITOR 40 MG tablet   Generic drug:  atorvastatin      Take 40 mg by mouth daily        lisinopril 2.5 MG tablet    PRINIVIL/Zestril    90 tablet    Take 1 tablet (2.5 mg) by mouth daily    Coronary artery disease due to calcified coronary lesion       METOPROLOL TARTRATE PO      Take 80 mg by mouth 2 times daily        NITROGLYCERIN SL      Place 0.4 mg under the tongue        order for DME     1 Units    Battery home blood pressure machine    Coronary artery disease due to calcified coronary lesion

## 2017-08-23 ENCOUNTER — HOSPITAL ENCOUNTER (OUTPATIENT)
Dept: CARDIAC REHAB | Facility: CLINIC | Age: 67
End: 2017-08-23
Attending: FAMILY MEDICINE
Payer: COMMERCIAL

## 2017-08-23 PROCEDURE — 40000575 ZZH STATISTIC OP CARDIAC VISIT #2

## 2017-08-23 PROCEDURE — 93798 PHYS/QHP OP CAR RHAB W/ECG: CPT

## 2017-08-23 PROCEDURE — 93797 PHYS/QHP OP CAR RHAB WO ECG: CPT | Mod: 59

## 2017-08-23 PROCEDURE — 40000116 ZZH STATISTIC OP CR VISIT

## 2017-08-25 ENCOUNTER — HOSPITAL ENCOUNTER (OUTPATIENT)
Dept: CARDIAC REHAB | Facility: CLINIC | Age: 67
End: 2017-08-25
Attending: FAMILY MEDICINE
Payer: COMMERCIAL

## 2017-08-25 PROCEDURE — 93798 PHYS/QHP OP CAR RHAB W/ECG: CPT | Performed by: REHABILITATION PRACTITIONER

## 2017-08-25 PROCEDURE — 40000116 ZZH STATISTIC OP CR VISIT: Performed by: REHABILITATION PRACTITIONER

## 2017-08-28 ENCOUNTER — HOSPITAL ENCOUNTER (OUTPATIENT)
Dept: CARDIAC REHAB | Facility: CLINIC | Age: 67
End: 2017-08-28
Attending: FAMILY MEDICINE
Payer: COMMERCIAL

## 2017-08-28 PROCEDURE — 40000116 ZZH STATISTIC OP CR VISIT

## 2017-08-28 PROCEDURE — 40000575 ZZH STATISTIC OP CARDIAC VISIT #2

## 2017-08-28 PROCEDURE — 93798 PHYS/QHP OP CAR RHAB W/ECG: CPT

## 2017-08-28 PROCEDURE — 93797 PHYS/QHP OP CAR RHAB WO ECG: CPT | Mod: 59

## 2017-09-01 ENCOUNTER — HOSPITAL ENCOUNTER (OUTPATIENT)
Dept: CARDIAC REHAB | Facility: CLINIC | Age: 67
End: 2017-09-01
Attending: FAMILY MEDICINE
Payer: COMMERCIAL

## 2017-09-01 PROCEDURE — 40000116 ZZH STATISTIC OP CR VISIT: Performed by: REHABILITATION PRACTITIONER

## 2017-09-01 PROCEDURE — 93798 PHYS/QHP OP CAR RHAB W/ECG: CPT | Performed by: REHABILITATION PRACTITIONER

## 2017-09-06 ENCOUNTER — HOSPITAL ENCOUNTER (OUTPATIENT)
Dept: CARDIAC REHAB | Facility: CLINIC | Age: 67
End: 2017-09-06
Attending: FAMILY MEDICINE
Payer: COMMERCIAL

## 2017-09-06 PROCEDURE — 93798 PHYS/QHP OP CAR RHAB W/ECG: CPT

## 2017-09-06 PROCEDURE — 93797 PHYS/QHP OP CAR RHAB WO ECG: CPT

## 2017-09-06 PROCEDURE — 40000575 ZZH STATISTIC OP CARDIAC VISIT #2

## 2017-09-06 PROCEDURE — 40000116 ZZH STATISTIC OP CR VISIT

## 2017-09-07 DIAGNOSIS — I25.10 CORONARY ARTERY DISEASE INVOLVING NATIVE CORONARY ARTERY OF NATIVE HEART WITHOUT ANGINA PECTORIS: ICD-10-CM

## 2017-09-07 LAB
CHOLEST SERPL-MCNC: 155 MG/DL
HDLC SERPL-MCNC: 66 MG/DL
LDLC SERPL CALC-MCNC: 68 MG/DL
NONHDLC SERPL-MCNC: 89 MG/DL
TRIGL SERPL-MCNC: 105 MG/DL

## 2017-09-07 PROCEDURE — 80061 LIPID PANEL: CPT | Performed by: INTERNAL MEDICINE

## 2017-09-07 PROCEDURE — 36415 COLL VENOUS BLD VENIPUNCTURE: CPT | Performed by: INTERNAL MEDICINE

## 2017-09-08 ENCOUNTER — HOSPITAL ENCOUNTER (OUTPATIENT)
Dept: CARDIAC REHAB | Facility: CLINIC | Age: 67
End: 2017-09-08
Attending: FAMILY MEDICINE
Payer: COMMERCIAL

## 2017-09-08 PROCEDURE — 40000575 ZZH STATISTIC OP CARDIAC VISIT #2

## 2017-09-08 PROCEDURE — 93797 PHYS/QHP OP CAR RHAB WO ECG: CPT | Mod: 59

## 2017-09-08 PROCEDURE — 40000116 ZZH STATISTIC OP CR VISIT

## 2017-09-08 PROCEDURE — 93798 PHYS/QHP OP CAR RHAB W/ECG: CPT

## 2017-09-11 ENCOUNTER — HOSPITAL ENCOUNTER (OUTPATIENT)
Dept: CARDIAC REHAB | Facility: CLINIC | Age: 67
End: 2017-09-11
Attending: FAMILY MEDICINE
Payer: COMMERCIAL

## 2017-09-11 PROCEDURE — 40000116 ZZH STATISTIC OP CR VISIT

## 2017-09-11 PROCEDURE — 93798 PHYS/QHP OP CAR RHAB W/ECG: CPT

## 2017-09-13 ENCOUNTER — HOSPITAL ENCOUNTER (OUTPATIENT)
Dept: CARDIAC REHAB | Facility: CLINIC | Age: 67
End: 2017-09-13
Attending: FAMILY MEDICINE
Payer: COMMERCIAL

## 2017-09-13 PROCEDURE — 93798 PHYS/QHP OP CAR RHAB W/ECG: CPT

## 2017-09-13 PROCEDURE — 40000116 ZZH STATISTIC OP CR VISIT

## 2017-09-15 ENCOUNTER — HOSPITAL ENCOUNTER (OUTPATIENT)
Dept: CARDIAC REHAB | Facility: CLINIC | Age: 67
End: 2017-09-15
Attending: FAMILY MEDICINE
Payer: COMMERCIAL

## 2017-09-15 PROCEDURE — 93798 PHYS/QHP OP CAR RHAB W/ECG: CPT

## 2017-09-15 PROCEDURE — 40000116 ZZH STATISTIC OP CR VISIT

## 2017-09-18 ENCOUNTER — HOSPITAL ENCOUNTER (OUTPATIENT)
Dept: CARDIAC REHAB | Facility: CLINIC | Age: 67
End: 2017-09-18
Attending: FAMILY MEDICINE
Payer: COMMERCIAL

## 2017-09-18 PROCEDURE — 40000116 ZZH STATISTIC OP CR VISIT

## 2017-09-18 PROCEDURE — 93798 PHYS/QHP OP CAR RHAB W/ECG: CPT

## 2017-09-20 ENCOUNTER — HOSPITAL ENCOUNTER (OUTPATIENT)
Dept: CARDIAC REHAB | Facility: CLINIC | Age: 67
End: 2017-09-20
Attending: FAMILY MEDICINE
Payer: COMMERCIAL

## 2017-09-20 PROCEDURE — 93798 PHYS/QHP OP CAR RHAB W/ECG: CPT

## 2017-09-20 PROCEDURE — 40000116 ZZH STATISTIC OP CR VISIT

## 2017-09-22 ENCOUNTER — HOSPITAL ENCOUNTER (OUTPATIENT)
Dept: CARDIAC REHAB | Facility: CLINIC | Age: 67
End: 2017-09-22
Attending: FAMILY MEDICINE
Payer: COMMERCIAL

## 2017-09-22 PROCEDURE — 93798 PHYS/QHP OP CAR RHAB W/ECG: CPT

## 2017-09-22 PROCEDURE — 40000116 ZZH STATISTIC OP CR VISIT

## 2017-09-27 ENCOUNTER — HOSPITAL ENCOUNTER (OUTPATIENT)
Dept: CARDIAC REHAB | Facility: CLINIC | Age: 67
End: 2017-09-27
Attending: FAMILY MEDICINE
Payer: COMMERCIAL

## 2017-09-27 PROCEDURE — 93797 PHYS/QHP OP CAR RHAB WO ECG: CPT | Mod: 59

## 2017-09-27 PROCEDURE — 40000116 ZZH STATISTIC OP CR VISIT

## 2017-09-27 PROCEDURE — 40000575 ZZH STATISTIC OP CARDIAC VISIT #2

## 2017-09-27 PROCEDURE — 93798 PHYS/QHP OP CAR RHAB W/ECG: CPT

## 2017-09-29 ENCOUNTER — HOSPITAL ENCOUNTER (OUTPATIENT)
Dept: CARDIAC REHAB | Facility: CLINIC | Age: 67
End: 2017-09-29
Attending: FAMILY MEDICINE
Payer: COMMERCIAL

## 2017-09-29 PROCEDURE — 40000116 ZZH STATISTIC OP CR VISIT

## 2017-09-29 PROCEDURE — 93798 PHYS/QHP OP CAR RHAB W/ECG: CPT

## 2017-10-02 ENCOUNTER — HOSPITAL ENCOUNTER (OUTPATIENT)
Dept: CARDIAC REHAB | Facility: CLINIC | Age: 67
End: 2017-10-02
Attending: FAMILY MEDICINE
Payer: COMMERCIAL

## 2017-10-02 PROCEDURE — 93798 PHYS/QHP OP CAR RHAB W/ECG: CPT

## 2017-10-02 PROCEDURE — 40000116 ZZH STATISTIC OP CR VISIT

## 2017-10-04 ENCOUNTER — HOSPITAL ENCOUNTER (OUTPATIENT)
Dept: CARDIAC REHAB | Facility: CLINIC | Age: 67
End: 2017-10-04
Attending: FAMILY MEDICINE
Payer: COMMERCIAL

## 2017-10-04 PROCEDURE — 93798 PHYS/QHP OP CAR RHAB W/ECG: CPT

## 2017-10-04 PROCEDURE — 40000116 ZZH STATISTIC OP CR VISIT

## 2017-10-06 ENCOUNTER — HOSPITAL ENCOUNTER (OUTPATIENT)
Dept: CARDIAC REHAB | Facility: CLINIC | Age: 67
End: 2017-10-06
Attending: FAMILY MEDICINE
Payer: COMMERCIAL

## 2017-10-06 PROCEDURE — 93798 PHYS/QHP OP CAR RHAB W/ECG: CPT

## 2017-10-06 PROCEDURE — 40000116 ZZH STATISTIC OP CR VISIT

## 2017-10-09 ENCOUNTER — HOSPITAL ENCOUNTER (OUTPATIENT)
Dept: CARDIAC REHAB | Facility: CLINIC | Age: 67
End: 2017-10-09
Attending: FAMILY MEDICINE
Payer: COMMERCIAL

## 2017-10-09 PROCEDURE — 93798 PHYS/QHP OP CAR RHAB W/ECG: CPT

## 2017-10-09 PROCEDURE — 40000116 ZZH STATISTIC OP CR VISIT

## 2017-11-01 ENCOUNTER — OFFICE VISIT (OUTPATIENT)
Dept: CARDIOLOGY | Facility: CLINIC | Age: 67
End: 2017-11-01
Attending: INTERNAL MEDICINE
Payer: COMMERCIAL

## 2017-11-01 ENCOUNTER — HOSPITAL ENCOUNTER (OUTPATIENT)
Dept: CARDIOLOGY | Facility: CLINIC | Age: 67
Discharge: HOME | End: 2017-11-01
Attending: INTERNAL MEDICINE | Admitting: INTERNAL MEDICINE
Payer: COMMERCIAL

## 2017-11-01 VITALS
HEART RATE: 55 BPM | DIASTOLIC BLOOD PRESSURE: 64 MMHG | SYSTOLIC BLOOD PRESSURE: 139 MMHG | BODY MASS INDEX: 34.2 KG/M2 | WEIGHT: 187 LBS | OXYGEN SATURATION: 99 %

## 2017-11-01 DIAGNOSIS — R07.9 CHEST PAIN: ICD-10-CM

## 2017-11-01 DIAGNOSIS — R07.89 OTHER CHEST PAIN: Primary | ICD-10-CM

## 2017-11-01 DIAGNOSIS — I25.10 CORONARY ARTERY DISEASE INVOLVING NATIVE CORONARY ARTERY OF NATIVE HEART WITHOUT ANGINA PECTORIS: ICD-10-CM

## 2017-11-01 PROCEDURE — 93005 ELECTROCARDIOGRAM TRACING: CPT

## 2017-11-01 PROCEDURE — 99214 OFFICE O/P EST MOD 30 MIN: CPT | Mod: 25 | Performed by: INTERNAL MEDICINE

## 2017-11-01 PROCEDURE — 93010 ELECTROCARDIOGRAM REPORT: CPT | Performed by: INTERNAL MEDICINE

## 2017-11-01 NOTE — MR AVS SNAPSHOT
After Visit Summary   11/1/2017    Lily Linares    MRN: [id number]           Patient Information     Date Of Birth          1950        Visit Information        Provider Department      11/1/2017 10:00 AM Don Hernandez MD [facility]        Today's Diagnoses     Other chest pain    -  1    Coronary artery disease involving native coronary artery of native heart without angina pectoris           Follow-ups after your visit        Your next 10 appointments already scheduled     Nov 06, 2017  1:00 PM CST   NM MPI TREADMILL with WYNM2, WY STRESS TEST   [facility] ([facility])    [address] [address]   [phone]           For a ONE day exam: Allow 3-4 hours for test. For a TWO day exam: Allow 2 hours PER day for test.  You may need to stop some medicines before the test. Follow your doctor s orders. - If you take a beta blocker: Follow your doctor s specific instructions on taking it prior to and on the day of your exam. - If you take Aggrenox or dipyridamole (Persantine, Permole), stop taking it 48 hours before your test. - If you take Viagra, Cialis or Levitra, stop taking it 48 hours before your test. - If you take theophylline or aminophylline, stop taking it 12 hours before your test.  For patients with diabetes: - If you take insulin, call your diabetes care team. Ask if you should take a 1/2 dose the morning of your test. - If you take diabetes medicine by mouth, don t take it on the morning of your test. Bring it with you to take after the test. (If you have questions, call your diabetes care team.)  Do not take nitrates on the day of your test. Do not wear your Nitro-Patch.  Stop all caffeine 12 hours before the test. This includes coffee, tea, soda pop, chocolate and certain medicines (such as Anacin, Excedrin and NoDoz). Also avoid decaf coffee and tea, as these contain  small amounts of caffeine.  No alcohol, smoking or other tobacco for 12 hours before the test.  Stop eating 3 hours before the test. You may drink water.  Please wear a loose two-piece outfit. If you will have an exercise test, bring rubber-soled walking shoes.  When you arrive, please tell us if you: - Have diabetes - Are breastfeeding - May be pregnant - Have a pacemaker of ICD (implantable defibrillator).  Please call your Imaging Department at your exam site with any questions.              Future tests that were ordered for you today     Open Future Orders        Priority Expected Expires Ordered    NM Exercise stress test (nuc card) Routine 11/8/2017 11/1/2018 11/1/2017    EKG 12-LEAD CLINIC READ ([facility])- to be scheduled Routine 11/1/2017 11/1/2018 11/1/2017            Who to contact     If you have questions or need follow up information about today's clinic visit or your schedule please contact [facility] directly at [phone].  Normal or non-critical lab and imaging results will be communicated to you by [employer], letter or phone within 4 business days after the clinic has received the results. If you do not hear from us within 7 days, please contact the clinic through [employer] or phone. If you have a critical or abnormal lab result, we will notify you by phone as soon as possible.  Submit refill requests through [employer] or call your pharmacy and they will forward the refill request to us. Please allow 3 business days for your refill to be completed.          Additional Information About Your Visit        NMRKT Information     [employer] gives you secure access to your electronic health record. If you see a primary care provider, you can also send messages to your care team and make appointments. If you have questions, please call your primary care clinic.  If you do not have a primary care provider, please call [phone] and they will assist you.         Care EveryWhere ID     This is your Care EveryWhere ID. This could be used by other organizations to access your [place] medical records  [id number]        Your Vitals Were     Pulse Pulse Oximetry BMI (Body Mass Index)             55 99% 34.2 kg/m2          Blood Pressure from Last 3 Encounters:   11/01/17 139/64   08/22/17 129/68   08/11/17 126/65    Weight from Last 3 Encounters:   11/01/17 84.8 kg (187 lb)   08/22/17 85 kg (187 lb 6.4 oz)   08/11/17 83.5 kg (184 lb)              We Performed the Following     Follow-Up with Cardiac Advanced Practice Provider        Primary Care Provider Office Phone # Fax #    Joao Arsen Pulliam [phone] [address] [facility] [address]        Equal Access to Services     CHAYITO LEMUS : Hadii santiago siddiqi hadasho Somoraima, waaxda luqadaha, qaybta kaalmada adeegyada, jaspal anne . So [facility] [phone].    ATENCIÓN: Si habla español, tiene a sadler disposición servicios gratuitos de asistencia lingüística. Godwin al [phone].    We comply with applicable federal civil rights laws and [place] laws. We do not discriminate on the basis of race, color, national origin, age, disability, sex, sexual orientation, or gender identity.            Thank you!     Thank you for choosing [facility]  for your care. Our goal is always to provide you with excellent care. Hearing back from our patients is one way we can continue to improve our services. Please take a few minutes to complete the written survey that you may receive in the mail after your visit with us. Thank you!             Your Updated Medication List - Protect others around you: Learn how to safely use, store and throw away your medicines at www.disposemymeds.org.          This list is accurate as of: 11/1/17 11:39 AM.  Always use your most recent med list.                   Brand Name Dispense Instructions for use  Diagnosis    aspirin 81 MG tablet      Take 81 mg by mouth At Bedtime        clopidogrel 75 MG tablet    PLAVIX     Take 75 mg by mouth daily        famotidine 20 MG tablet    PEPCID     Take 20 mg by mouth daily as needed        LIPITOR 40 MG tablet   Generic drug:  atorvastatin      Take 40 mg by mouth daily        lisinopril 2.5 MG tablet    PRINIVIL/Zestril    90 tablet    Take 1 tablet (2.5 mg) by mouth daily    Coronary artery disease due to calcified coronary lesion       METOPROLOL TARTRATE PO      Take 80 mg by mouth 2 times daily        NITROGLYCERIN SL      Place 0.4 mg under the tongue        order for DME     1 Units    Battery home blood pressure machine    Coronary artery disease due to calcified coronary lesion

## 2017-11-01 NOTE — LETTER
11/1/2017    Joao Pulliam MD  [address]  [facility] [address]    RE: Lily Linares       Dear Colleague,    I had the pleasure of seeing Lily Lianres in the [facility].    CARDIOLOGY VISIT    REASON FOR VISIT: f/u CAD    SUBJECTIVE:  66 year old female seen for f/u of coronary artery disease.      July 24, 2017 she presented with inferior STEMI and vifb arrest, symptom of throat tightness.  Coronary angiogram at [facility] showed thrombotic 100% occlusion of proximal RCA, 50% mid LAD with FFR 0.90, single drug-eluting stent of the proximal RCA.  Troponin peak of 20.      Lexiscan nuclear stress July 2017 at [facility] shows moderate infarct in the mid and apical inferior wall and inferoseptal wall, no ischemia, EF 60%, normal wall motion.  Echo July 2017 from [facility] showed EF 55%, normal RV size and function, no significant valve disease.       She had been feeling good up until a few days ago.  She now has some symptoms of fatigue with light activity.  She has a dull ache in her left chest that comes on randomly, not with exertion.  Blood pressure had been stable, but now is fluctuating between 120 and 160 for the past one week.  She denies any lightheadedness, dizziness, palpitations, or lower extremity edema.    MEDICATIONS:  Current Outpatient Prescriptions   Medication     clopidogrel (PLAVIX) 75 MG tablet     famotidine (PEPCID) 20 MG tablet     lisinopril (PRINIVIL/ZESTRIL) 2.5 MG tablet     order for DME     METOPROLOL TARTRATE PO     aspirin 81 MG tablet     atorvastatin (LIPITOR) 40 MG tablet     NITROGLYCERIN SL     No current facility-administered medications for this visit.        ALLERGIES:  Allergies   Allergen Reactions     Brilinta [Ticagrelor]      [place] throat was closing     Oxycodone      She said that during her hospitalization in February 2017 she was treated with oxycodone and shortly after she felt chest pain, paresthesias on  the lips, wooziness, feeling awful.       REVIEW OF SYSTEMS:  Constitutional:  No weight loss, fever, chills, weakness or fatigue.  HEENT:  Eyes:  No visual loss, blurred vision, double vision or yellow sclerae. No hearing loss, sneezing, congestion, runny nose or sore throat.  Skin:  No rash or itching.  Cardiovascular: per HPI  Respiratory: per HPI  GI:  No anorexia, nausea, vomiting or diarrhea. No abdominal pain or blood.  :  No dysurea, hematuria  Neurologic:  No headache, dizziness, syncope, paralysis, ataxia, numbness or tingling in the extremities. No change in bowel or bladder control.  Musculoskeletal:  No muscle, back pain, joint pain or stiffness.  Hematologic:  No anemia, bleeding or bruising.  Lymphatics:  No enlarged nodes. No history of splenectomy.  Psychiatric:  No history of depression or anxiety.  Endocrine:  No reports of sweating, cold or heat intolerance. No polyuria or polydipsia.  Allergies:  No history of asthma, hives, eczema or rhinitis.    PHYSICAL EXAM:      BP: 139/64 Pulse: 55     SpO2: 99 %      Vital Signs with Ranges  Pulse:  [55] 55  BP: (139)/(64) 139/64  SpO2:  [99 %] 99 %  187 lbs 0 oz    Constitutional: awake, alert, no distress  Eyes: PERRL, sclera nonicteric  ENT: trachea midline  Respiratory: Lungs clear  Cardiovascular: Regular rate and rhythm, no murmurs  GI: nondistended, nontender, bowel sounds present  Lymph/Hematologic: no lymphadenopathy  Skin: dry, no rash  Musculoskeletal: good muscle tone, strength 5/5 in upper and lower extremities  Neurologic: no focal deficits  Neuropsychiatric: appropriate affact    DATA:  Lab: Sept 2017: Cholesterol 155, triglycerides 105, HDL 66, LDL 68    EKG: November 1, 2017: Sinus rhythm, rate 54, T-wave inversion V1 and V2, no Q waves.  No changes from previous EKG    ASSESSMENT:  65 y/o female seen for follow-up of coronary artery disease.  Her symptoms the last few days are fairly atypical for angina.  Her chest dullness comes on at  rest, not with exertion.  Blood pressure has been a little volatile.    She did have a 50% mid LAD lesion with FFR of 0.90 in July 2017.  It would be somewhat unusual for this to progress or to have in-stent restenosis after only 3 months.  Treadmill nuclear stress test will be done.  Expect this will show the inferior infarct, but low threshold for angiogram with any new areas of ischemia.    RECOMMENDATIONS:  1.  Atypical chest pain with known coronary artery disease and recent stent  - Treadmill nuclear stress test  - Hold metoprolol the night before and morning of the test    Follow-up as previously scheduled or sooner if test is abnormal.    Don Hernandez MD  Cardiology - [facility]  Pager:  [phone]  Text Page  November 1, 2017    Thank you for allowing me to participate in the care of your patient.    Sincerely,     Don Hernandez MD     [facility]

## 2017-11-01 NOTE — PROGRESS NOTES
CARDIOLOGY VISIT    REASON FOR VISIT: f/u CAD    SUBJECTIVE:  66 year old female seen for f/u of coronary artery disease.      July 24, 2017 she presented with inferior STEMI and vifb arrest, symptom of throat tightness.  Coronary angiogram at [facility] showed thrombotic 100% occlusion of proximal RCA, 50% mid LAD with FFR 0.90, single drug-eluting stent of the proximal RCA.  Troponin peak of 20.      Lexiscan nuclear stress July 2017 at [facility] shows moderate infarct in the mid and apical inferior wall and inferoseptal wall, no ischemia, EF 60%, normal wall motion.  Echo July 2017 from [facility] showed EF 55%, normal RV size and function, no significant valve disease.       She had been feeling good up until a few days ago.  She now has some symptoms of fatigue with light activity.  She has a dull ache in her left chest that comes on randomly, not with exertion.  Blood pressure had been stable, but now is fluctuating between 120 and 160 for the past one week.  She denies any lightheadedness, dizziness, palpitations, or lower extremity edema.    MEDICATIONS:  Current Outpatient Prescriptions   Medication     clopidogrel (PLAVIX) 75 MG tablet     famotidine (PEPCID) 20 MG tablet     lisinopril (PRINIVIL/ZESTRIL) 2.5 MG tablet     order for DME     METOPROLOL TARTRATE PO     aspirin 81 MG tablet     atorvastatin (LIPITOR) 40 MG tablet     NITROGLYCERIN SL     No current facility-administered medications for this visit.        ALLERGIES:  Allergies   Allergen Reactions     Brilinta [Ticagrelor]      [place] throat was closing     Oxycodone      She said that during her hospitalization in February 2017 she was treated with oxycodone and shortly after she felt chest pain, paresthesias on the lips, wooziness, feeling awful.       REVIEW OF SYSTEMS:  Constitutional:  No weight loss, fever, chills, weakness or fatigue.  HEENT:  Eyes:  No visual loss, blurred vision, double vision or yellow sclerae. No hearing  loss, sneezing, congestion, runny nose or sore throat.  Skin:  No rash or itching.  Cardiovascular: per HPI  Respiratory: per HPI  GI:  No anorexia, nausea, vomiting or diarrhea. No abdominal pain or blood.  :  No dysurea, hematuria  Neurologic:  No headache, dizziness, syncope, paralysis, ataxia, numbness or tingling in the extremities. No change in bowel or bladder control.  Musculoskeletal:  No muscle, back pain, joint pain or stiffness.  Hematologic:  No anemia, bleeding or bruising.  Lymphatics:  No enlarged nodes. No history of splenectomy.  Psychiatric:  No history of depression or anxiety.  Endocrine:  No reports of sweating, cold or heat intolerance. No polyuria or polydipsia.  Allergies:  No history of asthma, hives, eczema or rhinitis.    PHYSICAL EXAM:      BP: 139/64 Pulse: 55     SpO2: 99 %      Vital Signs with Ranges  Pulse:  [55] 55  BP: (139)/(64) 139/64  SpO2:  [99 %] 99 %  187 lbs 0 oz    Constitutional: awake, alert, no distress  Eyes: PERRL, sclera nonicteric  ENT: trachea midline  Respiratory: Lungs clear  Cardiovascular: Regular rate and rhythm, no murmurs  GI: nondistended, nontender, bowel sounds present  Lymph/Hematologic: no lymphadenopathy  Skin: dry, no rash  Musculoskeletal: good muscle tone, strength 5/5 in upper and lower extremities  Neurologic: no focal deficits  Neuropsychiatric: appropriate affact    DATA:  Lab: Sept 2017: Cholesterol 155, triglycerides 105, HDL 66, LDL 68    EKG: November 1, 2017: Sinus rhythm, rate 54, T-wave inversion V1 and V2, no Q waves.  No changes from previous EKG    ASSESSMENT:  65 y/o female seen for follow-up of coronary artery disease.  Her symptoms the last few days are fairly atypical for angina.  Her chest dullness comes on at rest, not with exertion.  Blood pressure has been a little volatile.    She did have a 50% mid LAD lesion with FFR of 0.90 in July 2017.  It would be somewhat unusual for this to progress or to have in-stent restenosis  after only 3 months.  Treadmill nuclear stress test will be done.  Expect this will show the inferior infarct, but low threshold for angiogram with any new areas of ischemia.    RECOMMENDATIONS:  1.  Atypical chest pain with known coronary artery disease and recent stent  - Treadmill nuclear stress test  - Hold metoprolol the night before and morning of the test    Follow-up as previously scheduled or sooner if test is abnormal.    Don Hernandez MD  Cardiology - [facility]  Pager:  [phone]  Text Page  November 1, 2017

## 2017-11-02 ENCOUNTER — TRANSFERRED RECORDS (OUTPATIENT)
Dept: HEALTH INFORMATION MANAGEMENT | Facility: CLINIC | Age: 67
End: 2017-11-02

## 2018-01-04 ENCOUNTER — TELEPHONE (OUTPATIENT)
Dept: FAMILY MEDICINE | Facility: CLINIC | Age: 68
End: 2018-01-04

## 2018-01-04 NOTE — TELEPHONE ENCOUNTER
Panel Management Review      Patient has the following on her problem list:     Hypertension   Last three blood pressure readings:  BP Readings from Last 3 Encounters:   11/01/17 139/64   08/22/17 129/68   08/11/17 126/65     Blood pressure: Passed    HTN Guidelines:  Age 18-59 BP range:  Less than 140/90  Age 60-85 with Diabetes:  Less than 140/90  Age 60-85 without Diabetes:  less than 150/90        Composite cancer screening  Chart review shows that this patient is due/due soon for the following Mammogram  Summary:    Patient is due/failing the following:   MAMMOGRAM    Action needed:   Patient needs referral/order: mammogram    Type of outreach:    Phone, left message for patient to call back.     Questions for provider review:    None                                                                                                                                    Kallie Martinez MA       Chart routed to Care Team .

## 2018-01-16 ENCOUNTER — HOSPITAL ENCOUNTER (EMERGENCY)
Facility: CLINIC | Age: 68
Discharge: HOME | End: 2018-01-16
Attending: NURSE PRACTITIONER | Admitting: NURSE PRACTITIONER
Payer: COMMERCIAL

## 2018-01-16 VITALS
RESPIRATION RATE: 18 BRPM | OXYGEN SATURATION: 97 % | BODY MASS INDEX: 33.84 KG/M2 | SYSTOLIC BLOOD PRESSURE: 144 MMHG | DIASTOLIC BLOOD PRESSURE: 80 MMHG | TEMPERATURE: 98.1 F | WEIGHT: 185 LBS

## 2018-01-16 DIAGNOSIS — J98.01 ACUTE BRONCHOSPASM: ICD-10-CM

## 2018-01-16 DIAGNOSIS — R05.8 POST-VIRAL COUGH SYNDROME: Primary | ICD-10-CM

## 2018-01-16 PROCEDURE — G0463 HOSPITAL OUTPT CLINIC VISIT: HCPCS

## 2018-01-16 PROCEDURE — 99213 OFFICE O/P EST LOW 20 MIN: CPT | Performed by: NURSE PRACTITIONER

## 2018-01-16 RX ORDER — ALBUTEROL SULFATE 90 UG/1
2 AEROSOL, METERED RESPIRATORY (INHALATION) EVERY 6 HOURS PRN
Qty: 1 INHALER | Refills: 0 | Status: SHIPPED | OUTPATIENT
Start: 2018-01-16

## 2018-01-16 RX ORDER — PREDNISONE 20 MG/1
TABLET ORAL
Qty: 10 TABLET | Refills: 0 | Status: SHIPPED | OUTPATIENT
Start: 2018-01-16

## 2018-01-16 ASSESSMENT — ENCOUNTER SYMPTOMS
FEVER: 0
VOMITING: 0
NAUSEA: 0
COUGH: 1
DIARRHEA: 0
CONSTIPATION: 0
WHEEZING: 1
SORE THROAT: 0
SINUS PRESSURE: 0
SHORTNESS OF BREATH: 1
FATIGUE: 1
ACTIVITY CHANGE: 1

## 2018-01-16 NOTE — ED AVS SNAPSHOT
[facility]    [address]    [facility] [address]    Phone:  [phone]    Fax:  [phone]                                       Lily Linares   MRN: [id number]    Department:  [facility]   Date of Visit:  1/16/2018           Patient Information     Date Of Birth          1950        Your diagnoses for this visit were:     Acute bronchospasm     Post-viral cough syndrome        You were seen by Saadia Lockwood APRN CNP.      Follow-up Information     Follow up with Joao Pulliam MD In 1 week.    Specialties:  Family Practice, Occupational Medicine    Why:  If symptoms worsen, As needed    Contact information:    [address]  [facility] [address]  [phone]          Discharge Instructions         Bronchospasm (Adult)    Bronchospasm occurs when the airways (bronchial tubes) go into spasm and contract. This makes it hard to breathe and causes wheezing (a high-pitched whistling sound). Bronchospasm can also cause frequent coughing without wheezing.  Bronchospasm is due to irritation, inflammation, or allergic reaction of the airways. People with asthma get bronchospasm. However, not everyone with bronchospasm has asthma.  Being exposed to harmful fumes, a recent case of bronchitis, exercise, or a flare-up of chronic obstructive pulmonary disease (COPD) may cause the airways to spasm. An episode of bronchospasm may last 7 to 14 days. Medicine may be prescribed to relax the airways and prevent wheezing. Antibiotics will be prescribed only if your healthcare provider thinks there is a bacterial infection. Antibiotics do not help a viral infection.  Home care    Drink lots of water or other fluids (at least 10 glasses a day) during an attack. This will loosen lung secretions and make it easier to breathe. If you have heart or kidney disease, check with your doctor before you drink extra fluids.    Take prescribed medicine exactly  at the times advised. If you take an inhaled medicine to help with breathing, do not use it more than once every 4 hours, unless told to do so. If prescribed an antibiotic or prednisone, take all of the medicine, even if you are feeling better after a few days.    Do not smoke. Also avoid being exposed to secondhand smoke.    If you were given an inhaler, use it exactly as directed. If you need to use it more often than prescribed, your condition may be getting worse. Contact your healthcare provider.  Follow-up care  Follow up with your healthcare provider, or as advised.  Note: If you are age 65 or older, have a chronic lung disease or condition that affects your immune system, or you smoke, we recommend getting pneumococcal vaccinations, as well as an influenza vaccination (flu shot) every autumn. Ask your healthcare provider about this.  When to seek medical advice  Call your healthcare provider right away if any of these occur:    You need to use your inhalers more often than usual.    You develop a fever of 100.4 F (38 C) or higher.    You are coughing up lots of dark-colored sputum (mucus).    You do not start to improve within 24 hours.  Call 911, or get immediate medical care  Contact emergency services if any of these occur:    Coughing up bloody sputum (mucus)    Chest pain with each breath    Increased wheezing or shortness of breath  Date Last Reviewed: 9/13/2015 2000-2017 [employer]. [address] [place] [address]. All rights reserved. This information is not intended as a substitute for professional medical care. Always follow your healthcare professional's instructions.          24 Hour Appointment Hotline       To make an appointment at any [facility], call [phone] ([phone]). If you don't have a family doctor or clinic, we will help you find one. [facility] are conveniently located to serve the needs of you and your family.             Review of  your medicines      START taking        Dose / Directions Last dose taken    albuterol 108 (90 BASE) MCG/ACT Inhaler   Commonly known as:  PROAIR HFA/PROVENTIL HFA/VENTOLIN HFA   Dose:  2 puff   Quantity:  1 Inhaler        Inhale 2 puffs into the lungs every 6 hours as needed for shortness of breath / dyspnea or wheezing   Refills:  0        predniSONE 20 MG tablet   Commonly known as:  DELTASONE   Quantity:  10 tablet        Take two tablets (= 40mg) each day for 5 (five) days   Refills:  0          Our records show that you are taking the medicines listed below. If these are incorrect, please call your family doctor or clinic.        Dose / Directions Last dose taken    aspirin 81 MG tablet   Dose:  81 mg        Take 81 mg by mouth At Bedtime   Refills:  0        clopidogrel 75 MG tablet   Commonly known as:  PLAVIX   Dose:  75 mg        Take 75 mg by mouth daily   Refills:  0        famotidine 20 MG tablet   Commonly known as:  PEPCID   Dose:  20 mg        Take 20 mg by mouth daily as needed   Refills:  0        LIPITOR 40 MG tablet   Dose:  40 mg   Generic drug:  atorvastatin        Take 40 mg by mouth daily   Refills:  0        lisinopril 2.5 MG tablet   Commonly known as:  PRINIVIL/Zestril   Dose:  2.5 mg   Quantity:  90 tablet        Take 1 tablet (2.5 mg) by mouth daily   Refills:  3        METOPROLOL TARTRATE PO   Dose:  80 mg        Take 80 mg by mouth 2 times daily   Refills:  0        NITROGLYCERIN SL   Dose:  0.4 mg        Place 0.4 mg under the tongue   Refills:  0        order for DME   Quantity:  1 Units        Battery home blood pressure machine   Refills:  0                Prescriptions were sent or printed at these locations (2 Prescriptions)                   [facility], [place] - [address]   [address] [facility] [address]    Telephone:  [phone]   Fax:  [phone]   Hours:                  E-Prescribed (2 of 2)          predniSONE (DELTASONE) 20 MG tablet               albuterol (PROAIR HFA/PROVENTIL HFA/VENTOLIN HFA) 108 (90 BASE) MCG/ACT Inhaler                Orders Needing Specimen Collection     None      Pending Results     No orders found from 1/14/2018 to 1/17/2018.            Pending Culture Results     No orders found from 1/14/2018 to 1/17/2018.            Pending Results Instructions     If you had any lab results that were not finalized at the time of your Discharge, you can call the ED Lab Result RN at [phone]. You will be contacted by this team for any positive Lab results or changes in treatment. The nurses are available 7 days a week from 10A to 6:30P.  You can leave a message 24 hours per day and they will return your call.        Test Results From Your Hospital Stay               Thank you for choosing [facility]       Thank you for choosing [facility] for your care. Our goal is always to provide you with excellent care. Hearing back from our patients is one way we can continue to improve our services. Please take a few minutes to complete the written survey that you may receive in the mail after you visit with us. Thank you!        [facility] Information     [employer] gives you secure access to your electronic health record. If you see a primary care provider, you can also send messages to your care team and make appointments. If you have questions, please call your primary care clinic.  If you do not have a primary care provider, please call [phone] and they will assist you.        Care EveryWhere ID     This is your Care EveryWhere ID. This could be used by other organizations to access your [facility] medical records  [id number]        Equal Access to Services     ASHLYN LEMUS : Hadii santiago Jaramillo, wamamieda lusangeethaadaha, qaybta kaalmada elise, jaspal yeager. So [facility] [phone].    ATENCIÓN: Si habla español, tiene a sadler disposición servicios gratuitos de asistencia  tete Louiechandni al [phone].    We comply with applicable federal civil rights laws and [place] laws. We do not discriminate on the basis of race, color, national origin, age, disability, sex, sexual orientation, or gender identity.            After Visit Summary       This is your record. Keep this with you and show to your community pharmacist(s) and doctor(s) at your next visit.

## 2018-01-16 NOTE — ED AVS SNAPSHOT
[facility]    [address] [address]    Phone:  [phone]    Fax:  [phone]                                       Lily Linares   MRN: [id number]    Department:  [facility]   Date of Visit:  1/16/2018           After Visit Summary Signature Page     I have received my discharge instructions, and my questions have been answered. I have discussed any challenges I see with this plan with the nurse or doctor.    ..........................................................................................................................................  Patient/Patient Representative Signature      ..........................................................................................................................................  Patient Representative Print Name and Relationship to Patient    ..................................................               ................................................  Date                                            Time    ..........................................................................................................................................  Reviewed by Signature/Title    ...................................................              ..............................................  Date                                                            Time

## 2018-01-16 NOTE — ED PROVIDER NOTES
History     Chief Complaint   Patient presents with     Cough     x 2 weeks     HPI  Lily Linares is a 67 year old female who presents to the ED with a 2 week history of non productive cough, chest tightness, hacking, feeling short of breath intermittently, coughing to the point of having a hard time catching her breath preceded by a 4-6 day history of sore throat, congestion, non productive cough, achiness and diarrhea, body aches, chills, fever.  She has tried cough drops with improvement of symptoms.  She denies chest pain, decreased appetite, decreased urine output, dental pain, ear pressure, facial pain/pressure, fever, nausea and vomiting.  She has been exposed to ill contacts at home. Predisposing factors include ill contact: Family member .      Problem List:    Patient Active Problem List    Diagnosis Date Noted     Coronary artery disease due to calcified coronary lesion 08/07/2017     Priority: Medium     Status post total right knee replacement 02/01/2017     Priority: Medium     GERD (gastroesophageal reflux disease) 02/01/2017     Priority: Medium     Hypertension goal BP (blood pressure) < 140/90 01/30/2017     Priority: Medium     Scar condition and fibrosis of skin 04/15/2014     Priority: Medium     Advanced directives, counseling/discussion 04/02/2014     Priority: Medium     Patient has completed an Advance/Health Care Directive (HCD), to bring in copy to be scanned into Epic.    Anali Yeung  April 2, 2014         Atopic rhinitis 03/16/2010     Priority: Medium     Obesity 10/16/2007     Priority: Medium     Problem list name updated by automated process. Provider to review          Past Medical History:    Past Medical History:   Diagnosis Date     Cellulitis 2013     FX LOWER HUMERUS NEC-OPEN 10/16/2007     Major depression in complete remission (H)      Obese        Past Surgical History:    Past Surgical History:   Procedure Laterality Date     ARTHROPLASTY KNEE Right 2/1/2017     Procedure: ARTHROPLASTY KNEE;  Surgeon: Dedrick Ibanez MD;  Location: [facility]     C APPENDECTOMY       ESOPHAGOSCOPY, GASTROSCOPY, DUODENOSCOPY (EGD), COMBINED  2014    Procedure: COMBINED ESOPHAGOSCOPY, GASTROSCOPY, DUODENOSCOPY (EGD), BIOPSY SINGLE OR MULTIPLE;  Gastroscopy;  Surgeon: Zachariah Nash MD;  Location: [facility]     MAMMOPLASTY REDUCTION BILATERAL  2014    Procedure: MAMMOPLASTY REDUCTION BILATERAL;  Bilateral Breast Reduction with Free Nipple/Areola Graft;  Surgeon: Flakita Garcia MD;  Location: [facility]     SURGICAL HISTORY OF -       lt ovary     SURGICAL HISTORY OF -       ORIF left elbow       Family History:    Family History   Problem Relation Age of Onset     DIABETES Brother      Hypertension Brother      CEREBROVASCULAR DISEASE Brother      C.A.D. Brother        age 50     Cardiovascular Brother      Thyroid Disease Mother      Cardiovascular Maternal Grandfather      MI at age 83     CANCER Maternal Uncle      prostate       Social History:  Marital Status:   [2]  Social History   Substance Use Topics     Smoking status: Never Smoker     Smokeless tobacco: Never Used     Alcohol use Yes      Comment: 1 a day if that        Medications:      predniSONE (DELTASONE) 20 MG tablet   albuterol (PROAIR HFA/PROVENTIL HFA/VENTOLIN HFA) 108 (90 BASE) MCG/ACT Inhaler   clopidogrel (PLAVIX) 75 MG tablet   famotidine (PEPCID) 20 MG tablet   lisinopril (PRINIVIL/ZESTRIL) 2.5 MG tablet   order for DME   METOPROLOL TARTRATE PO   aspirin 81 MG tablet   atorvastatin (LIPITOR) 40 MG tablet   NITROGLYCERIN SL       Review of Systems   Constitutional: Positive for activity change and fatigue. Negative for fever.   HENT: Negative for congestion, sinus pressure and sore throat.    Respiratory: Positive for cough, shortness of breath and wheezing.    Cardiovascular: Negative for chest pain.   Gastrointestinal: Negative for constipation, diarrhea, nausea and vomiting.   Skin:  Negative for rash.   All other systems reviewed and are negative.      Physical Exam   BP: 144/80  Heart Rate: 71  Temp: 98.1  F (36.7  C)  Resp: 18  Weight: 83.9 kg (185 lb)  SpO2: 97 %      Physical Exam  GENERAL APPEARANCE: Alert, intermittent discomfort and ill appearing  EYES: PERRL, EOM normal, conjunctiva pink, sclera nonicteric, lids normal  HENT: Oral mucosa and posterior oropharynx normal, moist mucous membranes  NECK: No adenopathy,masses or thyromegaly  RESP: expiratory wheezes, cough harsh and hacking intermittently noted  CV: regular rate and rhythm, no murmur, rub or gallop  SKIN: no suspicious lesions or rashes  PSYCHE: mentation appears normal, affect and mood normal    ED Course     ED Course     Procedures    Labs Ordered and Resulted from Time of ED Arrival Up to the Time of Departure from the ED - No data to display    Assessments & Plan (with Medical Decision Making)     I have reviewed the nursing notes.    I have reviewed the findings, diagnosis, plan and need for follow up with the patient.  Medical Decision Making:  Upper respiratory infection symptoms with Normal vitals.  DDx:  bronchitis, pneumonia, Viral URI (ie.. Influenza), sinusitis, post viral cough, asthma exacerbation  CXR is not indicated.  Rapid Strep test is not indicated.     Assessment:  Bronchospasm    Plan:   Rx wheezing, post viral cough, bronchospams  Albuterol MDI and prednisone    Reassurance given regarding lack of signs of serious infection.  Discussed home treatment with no further medications.  Recommend follow up in primary care as needed, or sooner if symptoms persist. Return to the ED with fever, trouble swallowing or breathing, or any other concerns.     Condition on disposition: Stable    Discharge Medication List as of 1/16/2018  1:55 PM      START taking these medications    Details   predniSONE (DELTASONE) 20 MG tablet Take two tablets (= 40mg) each day for 5 (five) days, Disp-10 tablet, R-0, E-Prescribe       albuterol (PROAIR HFA/PROVENTIL HFA/VENTOLIN HFA) 108 (90 BASE) MCG/ACT Inhaler Inhale 2 puffs into the lungs every 6 hours as needed for shortness of breath / dyspnea or wheezing, Disp-1 Inhaler, R-0, E-Prescribe             Final diagnoses:   Acute bronchospasm   Post-viral cough syndrome       1/16/2018   [facility] EMERGENCY DEPARTMENT     Saadia Lockwood, GERALD REBOLLEDO  01/16/18 0310

## 2018-01-16 NOTE — DISCHARGE INSTRUCTIONS
Bronchospasm (Adult)    Bronchospasm occurs when the airways (bronchial tubes) go into spasm and contract. This makes it hard to breathe and causes wheezing (a high-pitched whistling sound). Bronchospasm can also cause frequent coughing without wheezing.  Bronchospasm is due to irritation, inflammation, or allergic reaction of the airways. People with asthma get bronchospasm. However, not everyone with bronchospasm has asthma.  Being exposed to harmful fumes, a recent case of bronchitis, exercise, or a flare-up of chronic obstructive pulmonary disease (COPD) may cause the airways to spasm. An episode of bronchospasm may last 7 to 14 days. Medicine may be prescribed to relax the airways and prevent wheezing. Antibiotics will be prescribed only if your healthcare provider thinks there is a bacterial infection. Antibiotics do not help a viral infection.  Home care    Drink lots of water or other fluids (at least 10 glasses a day) during an attack. This will loosen lung secretions and make it easier to breathe. If you have heart or kidney disease, check with your doctor before you drink extra fluids.    Take prescribed medicine exactly at the times advised. If you take an inhaled medicine to help with breathing, do not use it more than once every 4 hours, unless told to do so. If prescribed an antibiotic or prednisone, take all of the medicine, even if you are feeling better after a few days.    Do not smoke. Also avoid being exposed to secondhand smoke.    If you were given an inhaler, use it exactly as directed. If you need to use it more often than prescribed, your condition may be getting worse. Contact your healthcare provider.  Follow-up care  Follow up with your healthcare provider, or as advised.  Note: If you are age 65 or older, have a chronic lung disease or condition that affects your immune system, or you smoke, we recommend getting pneumococcal vaccinations, as well as an influenza vaccination (flu shot)  every autumn. Ask your healthcare provider about this.  When to seek medical advice  Call your healthcare provider right away if any of these occur:    You need to use your inhalers more often than usual.    You develop a fever of 100.4 F (38 C) or higher.    You are coughing up lots of dark-colored sputum (mucus).    You do not start to improve within 24 hours.  Call 911, or get immediate medical care  Contact emergency services if any of these occur:    Coughing up bloody sputum (mucus)    Chest pain with each breath    Increased wheezing or shortness of breath  Date Last Reviewed: 9/13/2015 2000-2017 [employer]. [address] [place] [address], [place] [address]. All rights reserved. This information is not intended as a substitute for professional medical care. Always follow your healthcare professional's instructions.

## 2018-08-08 ENCOUNTER — TELEPHONE (OUTPATIENT)
Dept: FAMILY MEDICINE | Facility: CLINIC | Age: 68
End: 2018-08-08

## 2018-08-08 ASSESSMENT — ANXIETY QUESTIONNAIRES
GAD7 TOTAL SCORE: 2
5. BEING SO RESTLESS THAT IT IS HARD TO SIT STILL: NOT AT ALL
7. FEELING AFRAID AS IF SOMETHING AWFUL MIGHT HAPPEN: NOT AT ALL
6. BECOMING EASILY ANNOYED OR IRRITABLE: SEVERAL DAYS
1. FEELING NERVOUS, ANXIOUS, OR ON EDGE: NOT AT ALL
3. WORRYING TOO MUCH ABOUT DIFFERENT THINGS: NOT AT ALL
2. NOT BEING ABLE TO STOP OR CONTROL WORRYING: NOT AT ALL

## 2018-08-08 ASSESSMENT — PATIENT HEALTH QUESTIONNAIRE - PHQ9: 5. POOR APPETITE OR OVEREATING: SEVERAL DAYS

## 2018-08-10 ASSESSMENT — PATIENT HEALTH QUESTIONNAIRE - PHQ9: SUM OF ALL RESPONSES TO PHQ QUESTIONS 1-9: 2

## 2018-08-10 ASSESSMENT — ANXIETY QUESTIONNAIRES: GAD7 TOTAL SCORE: 2

## 2019-09-28 ENCOUNTER — HEALTH MAINTENANCE LETTER (OUTPATIENT)
Age: 69
End: 2019-09-28

## 2020-03-15 ENCOUNTER — HEALTH MAINTENANCE LETTER (OUTPATIENT)
Age: 70
End: 2020-03-15

## 2020-07-29 NOTE — ADDENDUM NOTE
Addended by: FERNY LOMBARDO on: 8/11/2017 11:28 AM     Modules accepted: Yuli    
Addended by: FERNY LOMBARDO on: 8/14/2017 07:02 AM     Modules accepted: Yuli    
27-Jul-2020 16:16

## 2021-01-10 ENCOUNTER — HEALTH MAINTENANCE LETTER (OUTPATIENT)
Age: 71
End: 2021-01-10

## 2021-05-08 ENCOUNTER — HEALTH MAINTENANCE LETTER (OUTPATIENT)
Age: 71
End: 2021-05-08

## 2021-10-23 ENCOUNTER — HEALTH MAINTENANCE LETTER (OUTPATIENT)
Age: 71
End: 2021-10-23

## 2022-06-04 ENCOUNTER — HEALTH MAINTENANCE LETTER (OUTPATIENT)
Age: 72
End: 2022-06-04

## 2022-10-09 ENCOUNTER — HEALTH MAINTENANCE LETTER (OUTPATIENT)
Age: 72
End: 2022-10-09

## 2023-06-10 ENCOUNTER — HEALTH MAINTENANCE LETTER (OUTPATIENT)
Age: 73
End: 2023-06-10

## 2023-10-28 ENCOUNTER — HEALTH MAINTENANCE LETTER (OUTPATIENT)
Age: 73
End: 2023-10-28

## 2024-08-03 ENCOUNTER — HEALTH MAINTENANCE LETTER (OUTPATIENT)
Age: 74
End: 2024-08-03

## (undated) DEVICE — HOOD T4 PROTECTIVE STERI FACE SHIELD 400-800

## (undated) DEVICE — DRAPE SHEET REV FOLD 3/4 9349

## (undated) DEVICE — BONE CLEANING TIP INTERPULSE  0210-010-000

## (undated) DEVICE — BONE CEMENT MIXEVAC III HI VAC KIT  0206-015-000

## (undated) DEVICE — NDL 18GA 1.5" 305196

## (undated) DEVICE — SU PDO 1 STRATAFIX 36X36CM CTX TAPERPOINT SXPD2B405

## (undated) DEVICE — GOWN IMPERVIOUS SPECIALTY XLG/XLONG 32474

## (undated) DEVICE — BNDG COBAN 6"X5YDS STERILE

## (undated) DEVICE — SOL WATER IRRIG 1000ML BOTTLE 07139-09

## (undated) DEVICE — GLOVE PROTEXIS W/NEU-THERA 8.0  2D73TE80

## (undated) DEVICE — STOCKING SLEEVE COMPRESSION CALF MED

## (undated) DEVICE — SU VICRYL 2-0 CT-1 36" UND J945H

## (undated) DEVICE — GLOVE PROTEXIS BLUE W/NEU-THERA 7.5  2D73EB75

## (undated) DEVICE — GOWN XLG DISP 9545

## (undated) DEVICE — KIT DRAIN CLOSED WOUND SUCTION MED 400ML RESVR

## (undated) DEVICE — DRAPE STERI U 1015

## (undated) DEVICE — CATH TRAY FOLEY 16FR SILICONE 907416

## (undated) DEVICE — GLOVE PROTEXIS BLUE W/NEU-THERA 8.5  2D73EB85

## (undated) DEVICE — BLADE CLIPPER 4406

## (undated) DEVICE — SYR 50ML LL W/O NDL 309653

## (undated) DEVICE — Device

## (undated) DEVICE — GLOVE PROTEXIS W/NEU-THERA 7.5  2D73TE75

## (undated) DEVICE — SOL NACL 0.9% IRRIG 3000ML BAG 07972-08

## (undated) DEVICE — SUCTION IRR SYSTEM W/TIP INTERPULSE

## (undated) DEVICE — BLADE SAW SAGITTAL STRK 19.5X90X1.27MM 2108-109-000S11

## (undated) DEVICE — SOL NACL 0.9% IRRIG 1000ML BOTTLE 07138-09

## (undated) DEVICE — SUCTION TIP FLEXI CLEAR TIP DISP K62

## (undated) DEVICE — PREP DURAPREP 26ML APL 8630

## (undated) RX ORDER — FENTANYL CITRATE 50 UG/ML
INJECTION, SOLUTION INTRAMUSCULAR; INTRAVENOUS
Status: DISPENSED
Start: 2017-02-01

## (undated) RX ORDER — PROPOFOL 10 MG/ML
INJECTION, EMULSION INTRAVENOUS
Status: DISPENSED
Start: 2017-02-01

## (undated) RX ORDER — KETAMINE HYDROCHLORIDE 10 MG/ML
INJECTION, SOLUTION INTRAMUSCULAR; INTRAVENOUS
Status: DISPENSED
Start: 2017-02-01

## (undated) RX ORDER — DEXAMETHASONE SODIUM PHOSPHATE 4 MG/ML
INJECTION, SOLUTION INTRA-ARTICULAR; INTRALESIONAL; INTRAMUSCULAR; INTRAVENOUS; SOFT TISSUE
Status: DISPENSED
Start: 2017-02-01

## (undated) RX ORDER — ONDANSETRON 2 MG/ML
INJECTION INTRAMUSCULAR; INTRAVENOUS
Status: DISPENSED
Start: 2017-02-01

## (undated) RX ORDER — KETOROLAC TROMETHAMINE 30 MG/ML
INJECTION, SOLUTION INTRAMUSCULAR; INTRAVENOUS
Status: DISPENSED
Start: 2017-02-01